# Patient Record
Sex: FEMALE | Race: WHITE | URBAN - METROPOLITAN AREA
[De-identification: names, ages, dates, MRNs, and addresses within clinical notes are randomized per-mention and may not be internally consistent; named-entity substitution may affect disease eponyms.]

---

## 2024-02-19 ENCOUNTER — OFFICE VISIT (OUTPATIENT)
Dept: FAMILY MEDICINE CLINIC | Age: 79
End: 2024-02-19
Payer: COMMERCIAL

## 2024-02-19 ENCOUNTER — HOSPITAL ENCOUNTER (OUTPATIENT)
Age: 79
Setting detail: SPECIMEN
Discharge: HOME | End: 2024-02-19

## 2024-02-19 VITALS
SYSTOLIC BLOOD PRESSURE: 136 MMHG | TEMPERATURE: 97 F | DIASTOLIC BLOOD PRESSURE: 79 MMHG | HEIGHT: 63 IN | HEART RATE: 62 BPM | WEIGHT: 137.6 LBS | OXYGEN SATURATION: 99 % | BODY MASS INDEX: 24.38 KG/M2

## 2024-02-19 DIAGNOSIS — Z13.220 ENCOUNTER FOR LIPID SCREENING FOR CARDIOVASCULAR DISEASE: ICD-10-CM

## 2024-02-19 DIAGNOSIS — Z12.31 ENCOUNTER FOR SCREENING MAMMOGRAM FOR MALIGNANT NEOPLASM OF BREAST: ICD-10-CM

## 2024-02-19 DIAGNOSIS — Z13.6 ENCOUNTER FOR LIPID SCREENING FOR CARDIOVASCULAR DISEASE: ICD-10-CM

## 2024-02-19 DIAGNOSIS — Z76.89 ENCOUNTER TO ESTABLISH CARE WITH NEW DOCTOR: Primary | ICD-10-CM

## 2024-02-19 DIAGNOSIS — K86.2 PANCREAS CYST: ICD-10-CM

## 2024-02-19 DIAGNOSIS — Z13.1 DIABETES MELLITUS SCREENING: ICD-10-CM

## 2024-02-19 DIAGNOSIS — Z76.89 ENCOUNTER TO ESTABLISH CARE WITH NEW DOCTOR: ICD-10-CM

## 2024-02-19 DIAGNOSIS — R09.82 POSTNASAL DRIP: ICD-10-CM

## 2024-02-19 DIAGNOSIS — H53.8 BLURRED VISION: ICD-10-CM

## 2024-02-19 DIAGNOSIS — Z12.83 SKIN CANCER SCREENING: ICD-10-CM

## 2024-02-19 DIAGNOSIS — K57.90 DIVERTICULOSIS: ICD-10-CM

## 2024-02-19 DIAGNOSIS — R49.0 HOARSENESS OF VOICE: ICD-10-CM

## 2024-02-19 LAB
ALBUMIN SERPL-MCNC: 4.3 G/DL (ref 3.5–5.2)
ALBUMIN/GLOB SERPL: 1.4 {RATIO} (ref 1–2.5)
ALP SERPL-CCNC: 57 U/L (ref 35–104)
ALT SERPL-CCNC: 15 U/L (ref 5–33)
ANION GAP SERPL CALCULATED.3IONS-SCNC: 12 MMOL/L (ref 9–17)
AST SERPL-CCNC: 19 U/L
BACTERIA URNS QL MICRO: ABNORMAL
BASOPHILS # BLD: 0.04 K/UL (ref 0–0.2)
BASOPHILS NFR BLD: 1 % (ref 0–2)
BILIRUB SERPL-MCNC: 0.5 MG/DL (ref 0.3–1.2)
BILIRUB UR QL STRIP: NEGATIVE
BUN SERPL-MCNC: 19 MG/DL (ref 8–23)
CALCIUM SERPL-MCNC: 9.8 MG/DL (ref 8.6–10.4)
CANCER AG19-9 SERPL IA-ACNC: 42 U/ML (ref 0–35)
CASTS #/AREA URNS LPF: ABNORMAL /LPF (ref 0–8)
CHLORIDE SERPL-SCNC: 105 MMOL/L (ref 98–107)
CHOLEST SERPL-MCNC: 245 MG/DL
CHOLESTEROL/HDL RATIO: 3
CLARITY UR: CLEAR
CO2 SERPL-SCNC: 28 MMOL/L (ref 20–31)
COLOR UR: YELLOW
CREAT SERPL-MCNC: 0.9 MG/DL (ref 0.5–0.9)
EOSINOPHIL # BLD: 0.12 K/UL (ref 0–0.44)
EOSINOPHILS RELATIVE PERCENT: 2 % (ref 1–4)
EPI CELLS #/AREA URNS HPF: ABNORMAL /HPF (ref 0–5)
ERYTHROCYTE [DISTWIDTH] IN BLOOD BY AUTOMATED COUNT: 13.7 % (ref 11.8–14.4)
GFR SERPL CREATININE-BSD FRML MDRD: >60 ML/MIN/1.73M2
GLUCOSE SERPL-MCNC: 95 MG/DL (ref 70–99)
GLUCOSE UR STRIP-MCNC: NEGATIVE MG/DL
HCT VFR BLD AUTO: 42.4 % (ref 36.3–47.1)
HDLC SERPL-MCNC: 81 MG/DL
HGB BLD-MCNC: 13.6 G/DL (ref 11.9–15.1)
HGB UR QL STRIP.AUTO: NEGATIVE
IMM GRANULOCYTES # BLD AUTO: 0.04 K/UL (ref 0–0.3)
IMM GRANULOCYTES NFR BLD: 1 %
KETONES UR STRIP-MCNC: NEGATIVE MG/DL
LDLC SERPL CALC-MCNC: 142 MG/DL (ref 0–130)
LEUKOCYTE ESTERASE UR QL STRIP: ABNORMAL
LIPASE SERPL-CCNC: 32 U/L (ref 13–60)
LYMPHOCYTES NFR BLD: 1.6 K/UL (ref 1.1–3.7)
LYMPHOCYTES RELATIVE PERCENT: 31 % (ref 24–43)
MCH RBC QN AUTO: 30.7 PG (ref 25.2–33.5)
MCHC RBC AUTO-ENTMCNC: 32.1 G/DL (ref 28.4–34.8)
MCV RBC AUTO: 95.7 FL (ref 82.6–102.9)
MONOCYTES NFR BLD: 0.51 K/UL (ref 0.1–1.2)
MONOCYTES NFR BLD: 10 % (ref 3–12)
NEUTROPHILS NFR BLD: 55 % (ref 36–65)
NEUTS SEG NFR BLD: 2.85 K/UL (ref 1.5–8.1)
NITRITE UR QL STRIP: POSITIVE
NRBC BLD-RTO: 0 PER 100 WBC
PH UR STRIP: 5.5 [PH] (ref 5–8)
PLATELET # BLD AUTO: 239 K/UL (ref 138–453)
PMV BLD AUTO: 10.3 FL (ref 8.1–13.5)
POTASSIUM SERPL-SCNC: 5 MMOL/L (ref 3.7–5.3)
PROT SERPL-MCNC: 7.3 G/DL (ref 6.4–8.3)
PROT UR STRIP-MCNC: NEGATIVE MG/DL
RBC # BLD AUTO: 4.43 M/UL (ref 3.95–5.11)
RBC #/AREA URNS HPF: ABNORMAL /HPF (ref 0–4)
SODIUM SERPL-SCNC: 145 MMOL/L (ref 135–144)
SP GR UR STRIP: 1.02 (ref 1–1.03)
TRIGL SERPL-MCNC: 108 MG/DL
TSH SERPL DL<=0.05 MIU/L-ACNC: 3.03 UIU/ML (ref 0.3–5)
UROBILINOGEN UR STRIP-ACNC: NORMAL EU/DL (ref 0–1)
WBC #/AREA URNS HPF: ABNORMAL /HPF (ref 0–5)
WBC OTHER # BLD: 5.2 K/UL (ref 3.5–11.3)

## 2024-02-19 PROCEDURE — 1123F ACP DISCUSS/DSCN MKR DOCD: CPT | Performed by: FAMILY MEDICINE

## 2024-02-19 PROCEDURE — 99204 OFFICE O/P NEW MOD 45 MIN: CPT | Performed by: FAMILY MEDICINE

## 2024-02-19 RX ORDER — CYCLOSPORINE 0.5 MG/ML
1 EMULSION OPHTHALMIC 2 TIMES DAILY
COMMUNITY

## 2024-02-19 SDOH — ECONOMIC STABILITY: FOOD INSECURITY: WITHIN THE PAST 12 MONTHS, THE FOOD YOU BOUGHT JUST DIDN'T LAST AND YOU DIDN'T HAVE MONEY TO GET MORE.: NEVER TRUE

## 2024-02-19 SDOH — ECONOMIC STABILITY: INCOME INSECURITY: HOW HARD IS IT FOR YOU TO PAY FOR THE VERY BASICS LIKE FOOD, HOUSING, MEDICAL CARE, AND HEATING?: NOT HARD AT ALL

## 2024-02-19 SDOH — ECONOMIC STABILITY: HOUSING INSECURITY
IN THE LAST 12 MONTHS, WAS THERE A TIME WHEN YOU DID NOT HAVE A STEADY PLACE TO SLEEP OR SLEPT IN A SHELTER (INCLUDING NOW)?: NO

## 2024-02-19 SDOH — ECONOMIC STABILITY: FOOD INSECURITY: WITHIN THE PAST 12 MONTHS, YOU WORRIED THAT YOUR FOOD WOULD RUN OUT BEFORE YOU GOT MONEY TO BUY MORE.: NEVER TRUE

## 2024-02-19 ASSESSMENT — PATIENT HEALTH QUESTIONNAIRE - PHQ9
SUM OF ALL RESPONSES TO PHQ QUESTIONS 1-9: 0
1. LITTLE INTEREST OR PLEASURE IN DOING THINGS: 0
SUM OF ALL RESPONSES TO PHQ9 QUESTIONS 1 & 2: 0
2. FEELING DOWN, DEPRESSED OR HOPELESS: 0
SUM OF ALL RESPONSES TO PHQ QUESTIONS 1-9: 0

## 2024-02-19 NOTE — PROGRESS NOTES
score)  Never done    Respiratory Syncytial Virus (RSV) Pregnant or age 60 yrs+ (1 - 1-dose 60+ series) Never done    Pneumococcal 65+ years Vaccine (1 - PCV) Never done    Flu vaccine (1) Never done    Depression Screen  02/19/2025    Hepatitis A vaccine  Aged Out    Hepatitis B vaccine  Aged Out    Hib vaccine  Aged Out    Polio vaccine  Aged Out    Meningococcal (ACWY) vaccine  Aged Out       Assessment & Plan   Encounter to establish care with new doctor  -     CBC with Auto Differential; Future  -     Comprehensive Metabolic Panel; Future  -     TSH with Reflex; Future  -     Urinalysis; Future  -     Lipid Panel; Future  -     EKG 12 lead; Future  Pancreas cyst  -     AFL - Дмитрий Hatch DO, Gastroenterology, [place]  -     Lipase; Future  -     Cancer Antigen 19-9; Future  Diverticulosis  -     AFL - Дмитрий Hatch DO, Gastroenterology, [place]  -     CBC with Auto Differential; Future  -     Comprehensive Metabolic Panel; Future  -     TSH with Reflex; Future  -     Urinalysis; Future  -     Lipid Panel; Future  Blurred vision  -     External Referral To Ophthalmology  Hoarseness of voice  -     AFL - Donavan Shafer MD, Otolaryngology, [place]  Skin cancer screening  -     AFL - Shelli Harmon MD, Dermatology, [place]  Encounter for lipid screening for cardiovascular disease  -     Urinalysis; Future  -     Lipid Panel; Future  Diabetes mellitus screening  -     Comprehensive Metabolic Panel; Future  Encounter for screening mammogram for malignant neoplasm of breast  -     TSH with Reflex; Future  -     BETI DIGITAL SCREEN W OR WO CAD BILATERAL; Future  Postnasal drip    Overall the patient is doing well.  I discussed with her I would like to send her to gastroenterologist for further recommendation.  I really do not feel there is need to have an MRI done every 6 months but then again the specialist will advise different.  I did order blood work as requested.  Patient also needs to see a ophthalmologist as

## 2024-02-20 DIAGNOSIS — K86.2 PANCREATIC CYST: Primary | ICD-10-CM

## 2024-02-20 DIAGNOSIS — R97.8 ELEVATED CA 19-9 LEVEL: ICD-10-CM

## 2024-02-21 ENCOUNTER — OFFICE VISIT (OUTPATIENT)
Dept: FAMILY MEDICINE CLINIC | Age: 79
End: 2024-02-21
Payer: COMMERCIAL

## 2024-02-21 VITALS
DIASTOLIC BLOOD PRESSURE: 83 MMHG | BODY MASS INDEX: 24.55 KG/M2 | WEIGHT: 138.6 LBS | TEMPERATURE: 97 F | HEART RATE: 84 BPM | SYSTOLIC BLOOD PRESSURE: 136 MMHG | OXYGEN SATURATION: 99 %

## 2024-02-21 DIAGNOSIS — R97.8 ELEVATED CA 19-9 LEVEL: ICD-10-CM

## 2024-02-21 DIAGNOSIS — K86.2 PANCREAS CYST: Primary | ICD-10-CM

## 2024-02-21 PROCEDURE — 99213 OFFICE O/P EST LOW 20 MIN: CPT | Performed by: FAMILY MEDICINE

## 2024-02-21 PROCEDURE — 1123F ACP DISCUSS/DSCN MKR DOCD: CPT | Performed by: FAMILY MEDICINE

## 2024-02-21 NOTE — PROGRESS NOTES
General FM note    Roseanna Chua is a 78 y.o. female who presents today for follow up on her  medical conditions as noted below.  Roseanna Chua is c/o of   Chief Complaint   Patient presents with    Results     Labs 24       There is no problem list on file for this patient.     No past medical history on file.   No past surgical history on file.  No family history on file.  Current Outpatient Medications   Medication Sig Dispense Refill    cycloSPORINE (RESTASIS) 0.05 % ophthalmic emulsion 1 drop 2 times daily       No current facility-administered medications for this visit.     ALLERGIES:  No Known Allergies    Social History     Tobacco Use    Smoking status: Former     Current packs/day: 0.00     Average packs/day: 0.5 packs/day for 12.0 years (6.0 ttl pk-yrs)     Types: Cigarettes     Start date:      Quit date:      Years since quittin.1    Smokeless tobacco: Never   Substance Use Topics    Alcohol use: Not on file      Body mass index is 24.55 kg/m².  /83   Pulse 84   Temp 97 °F (36.1 °C)   Wt 62.9 kg (138 lb 9.6 oz)   SpO2 99%   BMI 24.55 kg/m²     Subjective:      HPI    78 y.o. female coming today to review her recent blood work.  The patient states that she really did not understand the phone message so she came in.    The CA 19-9 slightly elevated.  The patient has a history of a pancreatic cyst which was followed by yearly MRIs.  Patient tells me that she has an appointment with gastro set up already.  She also has the MRI scheduled already which I ordered.    Review of Systems   Constitutional: Negative for fever and unexpected weight change.   Pertinent items are noted in HPI.    Objective:   Physical Exam  Constitutional: VS (see above).   General appearance: normal development, habitus and attention, no deformities. No distress.  Eyes: normal conjunctiva and lids.  Skin: no rashes, lesions or ulcers.  Musculoskeletal: normal gait. Nails: no clubbing or

## 2024-02-26 ENCOUNTER — TELEPHONE (OUTPATIENT)
Dept: FAMILY MEDICINE CLINIC | Age: 79
End: 2024-02-26

## 2024-02-26 DIAGNOSIS — H53.8 BLURRED VISION: Primary | ICD-10-CM

## 2024-02-26 NOTE — TELEPHONE ENCOUNTER
Patient is having blurry vision and dizziness, has a referral to [facility]  but she can't get in for 3 weeks, is requesting a different Ophthalmologist so she can be seen sooner  Please advise.

## 2024-02-28 ENCOUNTER — OFFICE VISIT (OUTPATIENT)
Dept: FAMILY MEDICINE CLINIC | Age: 79
End: 2024-02-28
Payer: COMMERCIAL

## 2024-02-28 VITALS
HEART RATE: 78 BPM | BODY MASS INDEX: 24.45 KG/M2 | WEIGHT: 138 LBS | TEMPERATURE: 97.2 F | DIASTOLIC BLOOD PRESSURE: 73 MMHG | SYSTOLIC BLOOD PRESSURE: 138 MMHG | OXYGEN SATURATION: 99 %

## 2024-02-28 DIAGNOSIS — K59.04 CHRONIC IDIOPATHIC CONSTIPATION: ICD-10-CM

## 2024-02-28 DIAGNOSIS — R41.3 MEMORY CHANGE: Primary | ICD-10-CM

## 2024-02-28 DIAGNOSIS — R42 DIZZINESS: ICD-10-CM

## 2024-02-28 PROCEDURE — 1123F ACP DISCUSS/DSCN MKR DOCD: CPT | Performed by: FAMILY MEDICINE

## 2024-02-28 PROCEDURE — 99213 OFFICE O/P EST LOW 20 MIN: CPT | Performed by: FAMILY MEDICINE

## 2024-02-28 RX ORDER — MECLIZINE HYDROCHLORIDE 25 MG/1
25 TABLET ORAL 3 TIMES DAILY PRN
Qty: 15 TABLET | Refills: 0 | Status: SHIPPED | OUTPATIENT
Start: 2024-02-28 | End: 2024-03-09

## 2024-02-28 ASSESSMENT — PATIENT HEALTH QUESTIONNAIRE - PHQ9
SUM OF ALL RESPONSES TO PHQ QUESTIONS 1-9: 0
1. LITTLE INTEREST OR PLEASURE IN DOING THINGS: 0
2. FEELING DOWN, DEPRESSED OR HOPELESS: 0
SUM OF ALL RESPONSES TO PHQ9 QUESTIONS 1 & 2: 0
SUM OF ALL RESPONSES TO PHQ QUESTIONS 1-9: 0

## 2024-02-28 NOTE — PATIENT INSTRUCTIONS
Thank you for choosing [facility].  We know you have options when it comes to your healthcare; we appreciate that you chose us. Our goal is to provide exceptional  service and world class care to every patient.  You will be receiving a survey via email or text message asking for your feedback.  Please take a few minutes to share your thoughts about your recent visit. Your comments helps us understand what we do well and ways we can improve.  Thank you in advance for your valuable feedback.

## 2024-02-28 NOTE — PROGRESS NOTES
General FM note    Roseanna Chua is a 78 y.o. female who presents today for follow up on her  medical conditions as noted below.  Roseanna Chua is c/o of   Chief Complaint   Patient presents with    Nausea     Since Saturday    Dizziness    Facial Pain     Forehead/temples    Blurred Vision       There is no problem list on file for this patient.     No past medical history on file.   No past surgical history on file.  No family history on file.  Current Outpatient Medications   Medication Sig Dispense Refill    meclizine (ANTIVERT) 25 MG tablet Take 1 tablet by mouth 3 times daily as needed for Dizziness 15 tablet 0    psyllium (KONSYL) 60.3 % PACK powder Take 1 packet by mouth daily 30 each 4    cycloSPORINE (RESTASIS) 0.05 % ophthalmic emulsion 1 drop 2 times daily       No current facility-administered medications for this visit.     ALLERGIES:  No Known Allergies    Social History     Tobacco Use    Smoking status: Former     Current packs/day: 0.00     Average packs/day: 0.5 packs/day for 12.0 years (6.0 ttl pk-yrs)     Types: Cigarettes     Start date:      Quit date:      Years since quittin.1    Smokeless tobacco: Never   Substance Use Topics    Alcohol use: Not on file      Body mass index is 24.45 kg/m².  /73   Pulse 78   Temp 97.2 °F (36.2 °C)   Wt 62.6 kg (138 lb)   SpO2 99%   BMI 24.45 kg/m²     Subjective:      HPI    78 y.o. female coming in today with dizziness throughout the day also onset of nausea and onset of sinus pressure.  The symptoms the patient did have for 4 days.  She also states that she is not sleeping.  That she is very stressed overall her  passed away she moved back to [place] she had to sell the house and she is very very stressed.  She also has an MRI coming up as her pancreatic screening/ CA 19-9 was slightly elevated.  The patient just feels stressed.  And she states so she feels that her memory is slipping.  She has an appointment coming up

## 2024-03-12 ENCOUNTER — HOSPITAL ENCOUNTER (OUTPATIENT)
Age: 79
Discharge: HOME | End: 2024-03-12
Payer: MEDICARE

## 2024-03-12 ENCOUNTER — HOSPITAL ENCOUNTER (OUTPATIENT)
Dept: MRI IMAGING | Age: 79
Discharge: HOME | End: 2024-03-14
Payer: MEDICARE

## 2024-03-12 DIAGNOSIS — Z76.89 ENCOUNTER TO ESTABLISH CARE WITH NEW DOCTOR: ICD-10-CM

## 2024-03-12 DIAGNOSIS — K86.2 PANCREAS CYST: ICD-10-CM

## 2024-03-12 DIAGNOSIS — R97.8 ELEVATED CA 19-9 LEVEL: ICD-10-CM

## 2024-03-12 PROCEDURE — A9579 GAD-BASE MR CONTRAST NOS,1ML: HCPCS | Performed by: FAMILY MEDICINE

## 2024-03-12 PROCEDURE — 6360000004 HC RX CONTRAST MEDICATION: Performed by: FAMILY MEDICINE

## 2024-03-12 PROCEDURE — 2580000003 HC RX 258: Performed by: FAMILY MEDICINE

## 2024-03-12 PROCEDURE — 74183 MRI ABD W/O CNTR FLWD CNTR: CPT

## 2024-03-12 PROCEDURE — 93005 ELECTROCARDIOGRAM TRACING: CPT

## 2024-03-12 RX ORDER — SODIUM CHLORIDE 0.9 % (FLUSH) 0.9 %
10 SYRINGE (ML) INJECTION ONCE
Status: COMPLETED | OUTPATIENT
Start: 2024-03-12 | End: 2024-03-12

## 2024-03-12 RX ORDER — 0.9 % SODIUM CHLORIDE 0.9 %
100 INTRAVENOUS SOLUTION INTRAVENOUS ONCE
Status: COMPLETED | OUTPATIENT
Start: 2024-03-12 | End: 2024-03-12

## 2024-03-12 RX ADMIN — SODIUM CHLORIDE 50 ML: 9 INJECTION, SOLUTION INTRAVENOUS at 13:01

## 2024-03-12 RX ADMIN — GADOTERIDOL 12 ML: 279.3 INJECTION, SOLUTION INTRAVENOUS at 13:01

## 2024-03-12 RX ADMIN — SODIUM CHLORIDE, PRESERVATIVE FREE 10 ML: 5 INJECTION INTRAVENOUS at 13:01

## 2024-03-13 LAB
EKG ATRIAL RATE: 74 BPM
EKG P AXIS: 77 DEGREES
EKG P-R INTERVAL: 168 MS
EKG Q-T INTERVAL: 412 MS
EKG QRS DURATION: 82 MS
EKG QTC CALCULATION (BAZETT): 457 MS
EKG R AXIS: 15 DEGREES
EKG T AXIS: 68 DEGREES
EKG VENTRICULAR RATE: 74 BPM

## 2024-03-14 ENCOUNTER — HOSPITAL ENCOUNTER (OUTPATIENT)
Dept: CT IMAGING | Age: 79
Discharge: HOME | End: 2024-03-16
Payer: MEDICARE

## 2024-03-14 DIAGNOSIS — R41.3 MEMORY CHANGE: ICD-10-CM

## 2024-03-14 PROCEDURE — 70450 CT HEAD/BRAIN W/O DYE: CPT

## 2024-03-16 ENCOUNTER — HOSPITAL ENCOUNTER (OUTPATIENT)
Dept: MAMMOGRAPHY | Age: 79
End: 2024-03-16
Payer: MEDICARE

## 2024-03-16 DIAGNOSIS — Z12.31 ENCOUNTER FOR SCREENING MAMMOGRAM FOR MALIGNANT NEOPLASM OF BREAST: ICD-10-CM

## 2024-03-16 PROCEDURE — 77063 BREAST TOMOSYNTHESIS BI: CPT

## 2024-03-20 ENCOUNTER — TELEPHONE (OUTPATIENT)
Dept: FAMILY MEDICINE CLINIC | Age: 79
End: 2024-03-20

## 2024-03-20 DIAGNOSIS — R97.8 ELEVATED CA 19-9 LEVEL: Primary | ICD-10-CM

## 2024-03-20 DIAGNOSIS — Z01.818 PREOPERATIVE CLEARANCE: ICD-10-CM

## 2024-03-20 NOTE — TELEPHONE ENCOUNTER
----- Message from Allison Harmon sent at 3/19/2024  3:23 PM EDT -----  Subject: Referral Request    Reason for referral request? Patient needs a referral for a cardiologist   ASA, per Dr. Hatch , patient is to have a scope done but has to get   approval from a cardiologist. Please call patient   Provider patient wants to be referred to(if known):     Provider Phone Number(if known):    Additional Information for Provider?   ---------------------------------------------------------------------------  --------------  CALL BACK INFO    [phone]; OK to leave message on voicemail,OK to respond with electronic   message via [employer] portal (only for patients who have registered [employer]   account)  ---------------------------------------------------------------------------  --------------

## 2024-03-20 NOTE — TELEPHONE ENCOUNTER
----- Message from April Nagle sent at 3/20/2024  2:19 PM EDT -----  Subject: Referral Request    Reason for referral request? patient would like a referral to dr. osiel acevedo, cardiology. patient is seeing this physician on 4/11. per   patient she had the results of her nuclear stress test and Holter monitor   faxed to dr. hudson's office, and would like this included in the   referral. please call patient back to advise. thank you   Provider patient wants to be referred to(if known):     Provider Phone Number(if known):    Additional Information for Provider?   ---------------------------------------------------------------------------  --------------  CALL BACK INFO    [phone]; OK to leave message on voicemail  ---------------------------------------------------------------------------  --------------

## 2024-04-01 DIAGNOSIS — R92.8 ABNORMAL MAMMOGRAM OF RIGHT BREAST: Primary | ICD-10-CM

## 2024-04-03 DIAGNOSIS — R92.8 ABNORMAL MAMMOGRAM OF RIGHT BREAST: ICD-10-CM

## 2024-04-03 DIAGNOSIS — R92.8 ABNORMAL MAMMOGRAM: ICD-10-CM

## 2024-04-11 ENCOUNTER — OFFICE VISIT (OUTPATIENT)
Age: 79
End: 2024-04-11
Payer: MEDICARE

## 2024-04-11 VITALS
SYSTOLIC BLOOD PRESSURE: 158 MMHG | DIASTOLIC BLOOD PRESSURE: 77 MMHG | HEIGHT: 63 IN | HEART RATE: 90 BPM | BODY MASS INDEX: 24.63 KG/M2 | OXYGEN SATURATION: 100 % | RESPIRATION RATE: 16 BRPM | WEIGHT: 139 LBS

## 2024-04-11 DIAGNOSIS — Z01.810 PREOP CARDIOVASCULAR EXAM: Primary | ICD-10-CM

## 2024-04-11 PROCEDURE — 1123F ACP DISCUSS/DSCN MKR DOCD: CPT | Performed by: INTERNAL MEDICINE

## 2024-04-11 PROCEDURE — 99204 OFFICE O/P NEW MOD 45 MIN: CPT | Performed by: INTERNAL MEDICINE

## 2024-04-11 RX ORDER — FAMOTIDINE 20 MG/1
1 TABLET, FILM COATED ORAL EVERY MORNING
COMMUNITY
Start: 2023-04-18

## 2024-04-11 RX ORDER — FAMOTIDINE 10 MG
10 TABLET ORAL 2 TIMES DAILY
COMMUNITY

## 2024-04-11 RX ORDER — OMEPRAZOLE 40 MG/1
CAPSULE, DELAYED RELEASE ORAL
COMMUNITY
Start: 2024-03-01

## 2024-04-11 NOTE — PROGRESS NOTES
\"GLUCOSE\" in the last 72 hours.  PT/INR: No results for input(s): \"PROTIME\", \"INR\" in the last 72 hours.  FASTING LIPID PANEL:  Lab Results   Component Value Date/Time    HDL 81 02/19/2024 09:50 AM    TRIG 108 02/19/2024 09:50 AM     LIVER PROFILE:No results for input(s): \"AST\", \"ALT\", \"LABALBU\" in the last 72 hours.    EKG 03/2024: NSR, LAE, possible anterior infract       Past Medical and Surgical History, Problem List, Allergies, Medications, Labs, Imaging, all reviewed extensively in EMR and with the patient.    Assessment:   Preop cardiac evaluation prior to endoscopy  Abnormal ECG  Hypertension  Hyperlipidemia  Former smoker    Plan:   Will obtain echocardiogram to rule out any underlying structural heart disease  Patient will be low risk for endoscopy if echocardiogram within normal limits  Will retrieve cardiac stress test from [place]  Blood pressure elevated in office today, recommend close monitoring at home, low-salt diet discussed  Dietary modifications discussed for elevated LDL and cholesterol.  Repeat fasting lipid panel in 3-6 months.  If LDL remains elevated we will start low-dose statin      The patient was counseled regarding heart healthy diet, weight loss and exercise as tolerated. Patient's medications and side effects were discussed. All questions and concerns were addressed to patient's satisfaction.     The patient is to follow up in 6 months or sooner if necessary.     Thank you for allowing me to participate in the care of this patient, please do not hesitate to call if you have any questions.      Henri Faith MD, FACC, [facility]

## 2024-04-22 ENCOUNTER — HOSPITAL ENCOUNTER (OUTPATIENT)
Dept: MAMMOGRAPHY | Age: 79
Discharge: HOME | End: 2024-04-24
Payer: MEDICARE

## 2024-04-22 ENCOUNTER — HOSPITAL ENCOUNTER (OUTPATIENT)
Dept: ULTRASOUND IMAGING | Age: 79
End: 2024-04-22
Payer: MEDICARE

## 2024-04-22 DIAGNOSIS — R92.8 ABNORMAL MAMMOGRAM OF RIGHT BREAST: ICD-10-CM

## 2024-04-22 PROCEDURE — G0279 TOMOSYNTHESIS, MAMMO: HCPCS

## 2024-04-26 ENCOUNTER — HOSPITAL ENCOUNTER (OUTPATIENT)
Age: 79
End: 2024-04-26
Attending: INTERNAL MEDICINE
Payer: MEDICARE

## 2024-04-26 DIAGNOSIS — Z01.810 PREOP CARDIOVASCULAR EXAM: ICD-10-CM

## 2024-04-26 LAB
ECHO AO ROOT DIAM: 2.4 CM
ECHO AV PEAK GRADIENT: 6 MMHG
ECHO AV PEAK VELOCITY: 1.2 M/S
ECHO EST RA PRESSURE: 3 MMHG
ECHO LA AREA 4C: 11.7 CM2
ECHO LA DIAMETER: 2.8 CM
ECHO LA MAJOR AXIS: 4.6 CM
ECHO LA TO AORTIC ROOT RATIO: 1.17
ECHO LA VOL MOD A4C: 24 ML (ref 22–52)
ECHO LV E' LATERAL VELOCITY: 9 CM/S
ECHO LV E' SEPTAL VELOCITY: 6 CM/S
ECHO LV FRACTIONAL SHORTENING: 34 % (ref 28–44)
ECHO LV INTERNAL DIMENSION DIASTOLIC: 4.1 CM (ref 3.9–5.3)
ECHO LV INTERNAL DIMENSION SYSTOLIC: 2.7 CM
ECHO LV IVSD: 0.7 CM (ref 0.6–0.9)
ECHO LV MASS 2D: 89.4 G (ref 67–162)
ECHO LV POSTERIOR WALL DIASTOLIC: 0.8 CM (ref 0.6–0.9)
ECHO LV RELATIVE WALL THICKNESS RATIO: 0.39
ECHO MV A VELOCITY: 0.86 M/S
ECHO MV E DECELERATION TIME (DT): 246 MS
ECHO MV E VELOCITY: 0.65 M/S
ECHO MV E/A RATIO: 0.76
ECHO MV E/E' LATERAL: 7.22
ECHO MV E/E' RATIO (AVERAGED): 9.03
ECHO RIGHT VENTRICULAR SYSTOLIC PRESSURE (RVSP): 8 MMHG
ECHO TV REGURGITANT MAX VELOCITY: 1.12 M/S
ECHO TV REGURGITANT PEAK GRADIENT: 5 MMHG

## 2024-04-26 PROCEDURE — 93306 TTE W/DOPPLER COMPLETE: CPT

## 2024-04-26 PROCEDURE — 93306 TTE W/DOPPLER COMPLETE: CPT | Performed by: INTERNAL MEDICINE

## 2024-04-28 SDOH — HEALTH STABILITY: PHYSICAL HEALTH: ON AVERAGE, HOW MANY DAYS PER WEEK DO YOU ENGAGE IN MODERATE TO STRENUOUS EXERCISE (LIKE A BRISK WALK)?: 0 DAYS

## 2024-04-28 SDOH — HEALTH STABILITY: PHYSICAL HEALTH: ON AVERAGE, HOW MANY MINUTES DO YOU ENGAGE IN EXERCISE AT THIS LEVEL?: 0 MIN

## 2024-04-28 ASSESSMENT — LIFESTYLE VARIABLES
HOW MANY STANDARD DRINKS CONTAINING ALCOHOL DO YOU HAVE ON A TYPICAL DAY: PATIENT DOES NOT DRINK
HOW MANY STANDARD DRINKS CONTAINING ALCOHOL DO YOU HAVE ON A TYPICAL DAY: 0
HOW OFTEN DO YOU HAVE SIX OR MORE DRINKS ON ONE OCCASION: 1
HOW OFTEN DO YOU HAVE A DRINK CONTAINING ALCOHOL: NEVER
HOW OFTEN DO YOU HAVE A DRINK CONTAINING ALCOHOL: 1

## 2024-04-28 ASSESSMENT — PATIENT HEALTH QUESTIONNAIRE - PHQ9
1. LITTLE INTEREST OR PLEASURE IN DOING THINGS: NOT AT ALL
SUM OF ALL RESPONSES TO PHQ9 QUESTIONS 1 & 2: 0
SUM OF ALL RESPONSES TO PHQ QUESTIONS 1-9: 0
2. FEELING DOWN, DEPRESSED OR HOPELESS: NOT AT ALL
SUM OF ALL RESPONSES TO PHQ QUESTIONS 1-9: 0

## 2024-05-01 ENCOUNTER — HOSPITAL ENCOUNTER (OUTPATIENT)
Age: 79
Setting detail: SPECIMEN
Discharge: HOME | End: 2024-05-01

## 2024-05-01 ENCOUNTER — OFFICE VISIT (OUTPATIENT)
Dept: FAMILY MEDICINE CLINIC | Age: 79
End: 2024-05-01

## 2024-05-01 VITALS
TEMPERATURE: 97.2 F | DIASTOLIC BLOOD PRESSURE: 76 MMHG | BODY MASS INDEX: 24.98 KG/M2 | WEIGHT: 141 LBS | HEART RATE: 76 BPM | HEIGHT: 63 IN | SYSTOLIC BLOOD PRESSURE: 138 MMHG | OXYGEN SATURATION: 100 %

## 2024-05-01 DIAGNOSIS — R97.8 ELEVATED CA 19-9 LEVEL: ICD-10-CM

## 2024-05-01 DIAGNOSIS — Z78.0 ASYMPTOMATIC MENOPAUSE: ICD-10-CM

## 2024-05-01 DIAGNOSIS — Z00.00 WELCOME TO MEDICARE PREVENTIVE VISIT: Primary | ICD-10-CM

## 2024-05-01 LAB — CANCER AG19-9 SERPL IA-ACNC: 44 U/ML (ref 0–35)

## 2024-05-01 NOTE — PATIENT INSTRUCTIONS
Thank you for choosing [facility].  We know you have options when it comes to your healthcare; we appreciate that you chose us. Our goal is to provide exceptional  service and world class care to every patient.  You will be receiving a survey via email or text message asking for your feedback.  Please take a few minutes to share your thoughts about your recent visit. Your comments helps us understand what we do well and ways we can improve.  Thank you in advance for your valuable feedback.           Learning About Being Active as an Older Adult  Why is being active important as you get older?     Being active is one of the best things you can do for your health. And it's never too late to start. Being active--or getting active, if you aren't already--has definite benefits. It can:  Give you more energy,  Keep your mind sharp.  Improve balance to reduce your risk of falls.  Help you manage chronic illness with fewer medicines.  No matter how old you are, how fit you are, or what health problems you have, there is a form of activity that will work for you. And the more physical activity you can do, the better your overall health will be.  What kinds of activity can help you stay healthy?  Being more active will make your daily activities easier. Physical activity includes planned exercise and things you do in daily life. There are four types of activity:  Aerobic.  Doing aerobic activity makes your heart and lungs strong.  Includes walking, dancing, and gardening.  Aim for at least 2½ hours spread throughout the week.  It improves your energy and can help you sleep better.  Muscle-strengthening.  This type of activity can help maintain muscle and strengthen bones.  Includes climbing stairs, using resistance bands, and lifting or carrying heavy loads.  Aim for at least twice a week.  It can help protect the knees and other joints.  Stretching.  Stretching gives you better range of motion in joints and

## 2024-05-01 NOTE — PROGRESS NOTES
Inactivity Interventions:  See AVS for additional education material  Do you eat balanced/healthy meals regularly Interventions:  See AVS for additional education material                       Objective   Vitals:    05/01/24 0830   BP: 138/76   Pulse: 76   Temp: 97.2 °F (36.2 °C)   SpO2: 100%   Weight: 64 kg (141 lb)   Height: 1.6 m (5' 2.99\")      Body mass index is 24.98 kg/m².      HENT:   /76   Pulse 76   Temp 97.2 °F (36.2 °C)   Ht 1.6 m (5' 2.99\")   Wt 64 kg (141 lb)   SpO2 100%   BMI 24.98 kg/m²   Constitutional: Alert and oriented.  Well-nourished.  Head: Normocephalic and atraumatic.   Cardiovascular: Normal rate, regular rhythm, normal heart sounds.   Pulmonary/Chest: Effort normal and breath sounds normal. No respiratory distress.  She has no wheezes.  She has no rales.   Skin: Skin is warm and dry. No rash noted. No erythema.   Psychiatric:  She has a normal mood and affect. Behavior is normal.        Allergies   Allergen Reactions    Cat Hair Extract     Regadenoson Palpitations     Prior to Visit Medications    Medication Sig Taking? Authorizing Provider   famotidine (PEPCID) 20 MG tablet Take 1 tablet by mouth every morning Yes Hernando Braun MD   Multiple Vitamin (MULTI VITAMIN PO) Take by mouth Yes Hernando Braun MD   psyllium (KONSYL) 60.3 % PACK powder Take 1 packet by mouth daily Yes Arminda Easton MD   cycloSPORINE (RESTASIS) 0.05 % ophthalmic emulsion 1 drop 2 times daily Yes Hernando Braun MD       CareTeam (Including outside providers/suppliers regularly involved in providing care):   Patient Care Team:  Arminda Easton MD as PCP - General (Family Medicine)  Arminda Easton MD as PCP - Empaneled Provider     Reviewed and updated this visit:  Allergies  Meds        (Please note that portions of this note were completed with a voice recognition program. Efforts were made to edit the dictations but occasionally words are mis-transcribed.)

## 2024-05-02 ENCOUNTER — TELEPHONE (OUTPATIENT)
Age: 79
End: 2024-05-02

## 2024-05-02 ENCOUNTER — HOSPITAL ENCOUNTER (OUTPATIENT)
Dept: MAMMOGRAPHY | Age: 79
Discharge: HOME | End: 2024-05-04
Payer: MEDICARE

## 2024-05-02 DIAGNOSIS — Z78.0 ASYMPTOMATIC MENOPAUSE: ICD-10-CM

## 2024-05-02 PROCEDURE — 77080 DXA BONE DENSITY AXIAL: CPT

## 2024-05-02 NOTE — TELEPHONE ENCOUNTER
Pt called and stated Dr. Hatch is waiting for pt echo to be faxed over to schedule a procedure. Fax [phone] attn Sherrie

## 2024-09-12 ENCOUNTER — HOSPITAL ENCOUNTER (OUTPATIENT)
Dept: MRI IMAGING | Age: 79
Discharge: HOME | End: 2024-09-14
Attending: INTERNAL MEDICINE
Payer: MEDICARE

## 2024-09-12 ENCOUNTER — TELEPHONE (OUTPATIENT)
Dept: FAMILY MEDICINE CLINIC | Age: 79
End: 2024-09-12

## 2024-09-12 DIAGNOSIS — K86.2 PANCREATIC CYST: ICD-10-CM

## 2024-09-12 LAB
CREAT SERPL-MCNC: 1.2 MG/DL (ref 0.5–0.9)
GFR, ESTIMATED: 47 ML/MIN/1.73M2

## 2024-09-12 PROCEDURE — A9579 GAD-BASE MR CONTRAST NOS,1ML: HCPCS | Performed by: INTERNAL MEDICINE

## 2024-09-12 PROCEDURE — 6360000004 HC RX CONTRAST MEDICATION: Performed by: INTERNAL MEDICINE

## 2024-09-12 PROCEDURE — 82565 ASSAY OF CREATININE: CPT

## 2024-09-12 PROCEDURE — 74183 MRI ABD W/O CNTR FLWD CNTR: CPT

## 2024-09-12 PROCEDURE — 36415 COLL VENOUS BLD VENIPUNCTURE: CPT

## 2024-09-12 RX ADMIN — GADOTERIDOL 15 ML: 279.3 INJECTION, SOLUTION INTRAVENOUS at 08:57

## 2024-10-14 ENCOUNTER — HOSPITAL ENCOUNTER (OUTPATIENT)
Age: 79
Discharge: HOME | End: 2024-10-14
Payer: MEDICARE

## 2024-10-14 LAB
BUN SERPL-MCNC: 14 MG/DL (ref 8–23)
CANCER AG19-9 SERPL IA-ACNC: 43 U/ML (ref 0–35)
CREAT SERPL-MCNC: 1 MG/DL (ref 0.5–0.9)
GFR, ESTIMATED: 56 ML/MIN/1.73M2

## 2024-10-14 PROCEDURE — 84520 ASSAY OF UREA NITROGEN: CPT

## 2024-10-14 PROCEDURE — 36415 COLL VENOUS BLD VENIPUNCTURE: CPT

## 2024-10-14 PROCEDURE — 82565 ASSAY OF CREATININE: CPT

## 2024-10-14 PROCEDURE — 86301 IMMUNOASSAY TUMOR CA 19-9: CPT

## 2024-10-16 ENCOUNTER — TELEPHONE (OUTPATIENT)
Dept: GASTROENTEROLOGY | Age: 79
End: 2024-10-16

## 2024-10-16 ENCOUNTER — PREP FOR PROCEDURE (OUTPATIENT)
Dept: GASTROENTEROLOGY | Age: 79
End: 2024-10-16

## 2024-10-16 DIAGNOSIS — K86.2 PANCREATIC CYST: ICD-10-CM

## 2024-10-16 NOTE — TELEPHONE ENCOUNTER
Procedure scheduled/Hamdani  Procedure: EGD/EUS  Dx: PANCREATIC CYST  Ordering: ZACH  Date: 11/15/24  Time: 10:00AM/ARRIVE 8:30AM  Hospital: [facility]  Bowel prep instructions sent to patient: NONE  Patient advised by phone/mail: PHONE/MAILED EUS INSTRUCTIONS

## 2024-10-21 ENCOUNTER — OFFICE VISIT (OUTPATIENT)
Dept: FAMILY MEDICINE CLINIC | Age: 79
End: 2024-10-21
Payer: MEDICARE

## 2024-10-21 ENCOUNTER — HOSPITAL ENCOUNTER (OUTPATIENT)
Age: 79
Setting detail: SPECIMEN
Discharge: HOME | End: 2024-10-21

## 2024-10-21 VITALS
SYSTOLIC BLOOD PRESSURE: 135 MMHG | BODY MASS INDEX: 24.1 KG/M2 | OXYGEN SATURATION: 100 % | HEART RATE: 70 BPM | WEIGHT: 136 LBS | DIASTOLIC BLOOD PRESSURE: 75 MMHG | TEMPERATURE: 97.3 F

## 2024-10-21 DIAGNOSIS — R89.9 ABNORMAL LABORATORY TEST RESULT: ICD-10-CM

## 2024-10-21 DIAGNOSIS — S46.002A INJURY OF LEFT ROTATOR CUFF, INITIAL ENCOUNTER: Primary | ICD-10-CM

## 2024-10-21 DIAGNOSIS — N18.31 CHRONIC KIDNEY DISEASE, STAGE 3A (HCC): ICD-10-CM

## 2024-10-21 LAB
ALBUMIN: 4.5 G/DL (ref 3.5–5.2)
ANION GAP SERPL CALCULATED.3IONS-SCNC: 10 MMOL/L (ref 9–16)
BUN SERPL-MCNC: 18 MG/DL (ref 8–23)
CALCIUM SERPL-MCNC: 9.7 MG/DL (ref 8.6–10.4)
CHLORIDE SERPL-SCNC: 105 MMOL/L (ref 98–107)
CO2 SERPL-SCNC: 27 MMOL/L (ref 20–31)
CREAT SERPL-MCNC: 1.1 MG/DL (ref 0.5–0.9)
GFR, ESTIMATED: 52 ML/MIN/1.73M2
GLUCOSE SERPL-MCNC: 88 MG/DL (ref 74–99)
PHOSPHATE SERPL-MCNC: 3.7 MG/DL (ref 2.5–4.5)
POTASSIUM SERPL-SCNC: 5 MMOL/L (ref 3.7–5.3)
SODIUM SERPL-SCNC: 142 MMOL/L (ref 136–145)

## 2024-10-21 PROCEDURE — 99213 OFFICE O/P EST LOW 20 MIN: CPT | Performed by: FAMILY MEDICINE

## 2024-10-21 PROCEDURE — 1123F ACP DISCUSS/DSCN MKR DOCD: CPT | Performed by: FAMILY MEDICINE

## 2024-10-21 RX ORDER — LIDOCAINE HYDROCHLORIDE 40 MG/ML
SOLUTION TOPICAL
Qty: 50 ML | Refills: 1 | Status: SHIPPED | OUTPATIENT
Start: 2024-10-21 | End: 2024-10-28

## 2024-10-21 ASSESSMENT — PATIENT HEALTH QUESTIONNAIRE - PHQ9
SUM OF ALL RESPONSES TO PHQ9 QUESTIONS 1 & 2: 0
2. FEELING DOWN, DEPRESSED OR HOPELESS: NOT AT ALL
SUM OF ALL RESPONSES TO PHQ QUESTIONS 1-9: 0
SUM OF ALL RESPONSES TO PHQ QUESTIONS 1-9: 0
1. LITTLE INTEREST OR PLEASURE IN DOING THINGS: NOT AT ALL
SUM OF ALL RESPONSES TO PHQ QUESTIONS 1-9: 0
SUM OF ALL RESPONSES TO PHQ QUESTIONS 1-9: 0

## 2024-10-21 NOTE — PROGRESS NOTES
General FM note    Roseanna Chua is a 79 y.o. female who presents today for follow up on her  medical conditions as noted below.  Roseanna Chua is c/o of   Chief Complaint   Patient presents with    Shoulder Pain     Left shoulder    Discuss Labs     10/14/24       Patient Active Problem List:     Pancreatic cyst     Past Medical History:   Diagnosis Date    Allergic rhinitis     Hearing loss       Past Surgical History:   Procedure Laterality Date    US BREAST FINE NEEDLE ASPIRATION       Family History   Problem Relation Age of Onset    Heart Disease Mother         CHF    Cancer Father         Prostrate    Heart Disease Father         Enlarged heart    Cancer Brother         Bone cancer    Breast Cancer Neg Hx      Current Outpatient Medications   Medication Sig Dispense Refill    diclofenac sodium (VOLTAREN) 1 % GEL Apply 2 g topically 4 times daily 150 g 1    lidocaine (XYLOCAINE) 4 % external solution Apply topically as needed. 50 mL 1    famotidine (PEPCID) 20 MG tablet Take 1 tablet by mouth every morning      Multiple Vitamin (MULTI VITAMIN PO) Take by mouth      psyllium (KONSYL) 60.3 % PACK powder Take 1 packet by mouth daily 30 each 4    cycloSPORINE (RESTASIS) 0.05 % ophthalmic emulsion 1 drop 2 times daily       No current facility-administered medications for this visit.     ALLERGIES:    Allergies   Allergen Reactions    Cat Hair Extract     Regadenoson Palpitations       Social History     Tobacco Use    Smoking status: Former     Current packs/day: 0.00     Average packs/day: 0.5 packs/day for 12.0 years (6.0 ttl pk-yrs)     Types: Cigarettes     Start date: 1959     Quit date:      Years since quittin.8    Smokeless tobacco: Never    Tobacco comments:     Age 14 to 26   Substance Use Topics    Alcohol use: Not Currently     Comment: Stopped drinking 15years ago. Social      Body mass index is 24.1 kg/m².  /75   Pulse 70   Temp 97.3 °F (36.3 °C)   Wt 61.7 kg (136 lb)

## 2024-11-15 ENCOUNTER — ANESTHESIA EVENT (OUTPATIENT)
Dept: OPERATING ROOM | Age: 79
End: 2024-11-15
Payer: MEDICARE

## 2024-11-15 ENCOUNTER — HOSPITAL ENCOUNTER (OUTPATIENT)
Age: 79
Setting detail: OUTPATIENT SURGERY
Discharge: HOME | End: 2024-11-15
Attending: INTERNAL MEDICINE | Admitting: INTERNAL MEDICINE
Payer: MEDICARE

## 2024-11-15 ENCOUNTER — ANESTHESIA (OUTPATIENT)
Dept: OPERATING ROOM | Age: 79
End: 2024-11-15
Payer: MEDICARE

## 2024-11-15 VITALS
OXYGEN SATURATION: 98 % | HEART RATE: 54 BPM | HEIGHT: 63 IN | RESPIRATION RATE: 14 BRPM | SYSTOLIC BLOOD PRESSURE: 162 MMHG | DIASTOLIC BLOOD PRESSURE: 54 MMHG | BODY MASS INDEX: 23.74 KG/M2 | TEMPERATURE: 97.2 F | WEIGHT: 134 LBS

## 2024-11-15 DIAGNOSIS — K86.2 PANCREATIC CYST: ICD-10-CM

## 2024-11-15 LAB
AMYLASE FLD-CCNC: 946 U/L
CASE NUMBER:: NORMAL
SEND OUT REPORT: NORMAL
SPECIMEN DESCRIPTION: NORMAL
SPECIMEN TYPE: NORMAL
TEST NAME: NORMAL

## 2024-11-15 PROCEDURE — 6360000002 HC RX W HCPCS: Performed by: NURSE ANESTHETIST, CERTIFIED REGISTERED

## 2024-11-15 PROCEDURE — 88305 TISSUE EXAM BY PATHOLOGIST: CPT

## 2024-11-15 PROCEDURE — 2709999900 HC NON-CHARGEABLE SUPPLY: Performed by: INTERNAL MEDICINE

## 2024-11-15 PROCEDURE — 7100000011 HC PHASE II RECOVERY - ADDTL 15 MIN: Performed by: INTERNAL MEDICINE

## 2024-11-15 PROCEDURE — 2580000003 HC RX 258: Performed by: ANESTHESIOLOGY

## 2024-11-15 PROCEDURE — 3700000001 HC ADD 15 MINUTES (ANESTHESIA): Performed by: INTERNAL MEDICINE

## 2024-11-15 PROCEDURE — 3609012400 HC EGD TRANSORAL BIOPSY SINGLE/MULTIPLE: Performed by: INTERNAL MEDICINE

## 2024-11-15 PROCEDURE — 82150 ASSAY OF AMYLASE: CPT

## 2024-11-15 PROCEDURE — C1753 CATH, INTRAVAS ULTRASOUND: HCPCS | Performed by: INTERNAL MEDICINE

## 2024-11-15 PROCEDURE — 3700000000 HC ANESTHESIA ATTENDED CARE: Performed by: INTERNAL MEDICINE

## 2024-11-15 PROCEDURE — 7100000010 HC PHASE II RECOVERY - FIRST 15 MIN: Performed by: INTERNAL MEDICINE

## 2024-11-15 PROCEDURE — 3609020800 HC EGD W/EUS FNA: Performed by: INTERNAL MEDICINE

## 2024-11-15 PROCEDURE — 2500000003 HC RX 250 WO HCPCS: Performed by: NURSE ANESTHETIST, CERTIFIED REGISTERED

## 2024-11-15 RX ORDER — SODIUM CHLORIDE, SODIUM LACTATE, POTASSIUM CHLORIDE, CALCIUM CHLORIDE 600; 310; 30; 20 MG/100ML; MG/100ML; MG/100ML; MG/100ML
INJECTION, SOLUTION INTRAVENOUS CONTINUOUS
Status: DISCONTINUED | OUTPATIENT
Start: 2024-11-15 | End: 2024-11-15 | Stop reason: HOSPADM

## 2024-11-15 RX ORDER — PROPOFOL 10 MG/ML
INJECTION, EMULSION INTRAVENOUS
Status: DISCONTINUED | OUTPATIENT
Start: 2024-11-15 | End: 2024-11-15 | Stop reason: SDUPTHER

## 2024-11-15 RX ORDER — SODIUM CHLORIDE 0.9 % (FLUSH) 0.9 %
5-40 SYRINGE (ML) INJECTION EVERY 12 HOURS SCHEDULED
Status: DISCONTINUED | OUTPATIENT
Start: 2024-11-15 | End: 2024-11-15 | Stop reason: HOSPADM

## 2024-11-15 RX ORDER — CIPROFLOXACIN 250 MG/1
250 TABLET, FILM COATED ORAL 2 TIMES DAILY
Qty: 6 TABLET | Refills: 0 | Status: SHIPPED | OUTPATIENT
Start: 2024-11-15 | End: 2024-11-18

## 2024-11-15 RX ORDER — LIDOCAINE HYDROCHLORIDE 20 MG/ML
INJECTION, SOLUTION EPIDURAL; INFILTRATION; INTRACAUDAL; PERINEURAL
Status: DISCONTINUED | OUTPATIENT
Start: 2024-11-15 | End: 2024-11-15 | Stop reason: SDUPTHER

## 2024-11-15 RX ORDER — SODIUM CHLORIDE 9 MG/ML
INJECTION, SOLUTION INTRAVENOUS CONTINUOUS
Status: DISCONTINUED | OUTPATIENT
Start: 2024-11-15 | End: 2024-11-15 | Stop reason: HOSPADM

## 2024-11-15 RX ORDER — SODIUM CHLORIDE 9 MG/ML
INJECTION, SOLUTION INTRAVENOUS PRN
Status: DISCONTINUED | OUTPATIENT
Start: 2024-11-15 | End: 2024-11-15 | Stop reason: HOSPADM

## 2024-11-15 RX ORDER — CEFAZOLIN SODIUM 1 G/3ML
INJECTION, POWDER, FOR SOLUTION INTRAMUSCULAR; INTRAVENOUS
Status: DISCONTINUED | OUTPATIENT
Start: 2024-11-15 | End: 2024-11-15 | Stop reason: SDUPTHER

## 2024-11-15 RX ORDER — LIDOCAINE HYDROCHLORIDE 10 MG/ML
1 INJECTION, SOLUTION EPIDURAL; INFILTRATION; INTRACAUDAL; PERINEURAL
Status: DISCONTINUED | OUTPATIENT
Start: 2024-11-16 | End: 2024-11-15 | Stop reason: HOSPADM

## 2024-11-15 RX ORDER — SODIUM CHLORIDE 0.9 % (FLUSH) 0.9 %
5-40 SYRINGE (ML) INJECTION PRN
Status: DISCONTINUED | OUTPATIENT
Start: 2024-11-15 | End: 2024-11-15 | Stop reason: HOSPADM

## 2024-11-15 RX ADMIN — PROPOFOL 350 MCG/KG/MIN: 10 INJECTION, EMULSION INTRAVENOUS at 09:34

## 2024-11-15 RX ADMIN — LIDOCAINE HYDROCHLORIDE 60 MG: 20 INJECTION, SOLUTION EPIDURAL; INFILTRATION; INTRACAUDAL; PERINEURAL at 09:34

## 2024-11-15 RX ADMIN — SODIUM CHLORIDE: 9 INJECTION, SOLUTION INTRAVENOUS at 09:31

## 2024-11-15 RX ADMIN — CEFAZOLIN 2 G: 1 INJECTION, POWDER, FOR SOLUTION INTRAMUSCULAR; INTRAVENOUS at 09:56

## 2024-11-15 ASSESSMENT — PAIN - FUNCTIONAL ASSESSMENT
PAIN_FUNCTIONAL_ASSESSMENT: FACE, LEGS, ACTIVITY, CRY, AND CONSOLABILITY (FLACC)
PAIN_FUNCTIONAL_ASSESSMENT: 0-10

## 2024-11-15 NOTE — H&P
Interval H&P Note    Pt Name: Roseanna Chua  MRN: [id number]  YOB: 1945  Date of evaluation: 11/15/2024      [x] I have reviewed in EPIC the progress note by Dr. Easton dated 10/21/2024 for an Interval History and Physical note.     [x] I have examined  Roseanna Chua, a 79 y.o. female.There are no changes to the patient who is scheduled for ESOPHAGOGASTRODUODENOSCOPY, ENDOSCOPIC ULTRASOUND by Abhijit Cameron MD for Pancreatic cyst. Patient reports she noticed hoarseness in her voice approximately one year ago and was seen by ENT. She was diagnosed at that time with silent acid reflux and was prescribed famotidine. She reports she decided to discontinue the medication due to possible side effects. She had an MRI completed 09/12/24 that demonstrated \"1. 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 Recommend EUS/FNA if not already performed versus follow-up pancreas protocol MRI in 6 months. 2. Cholelithiasis.\" Patient reports chronic constipation and experiences intermittent sharp left upper quadrant abdominal pain. She denies bloody tarry stools, diarrhea, nausea, vomiting, or unintentional weight loss. Has had previous EGD. The patient denies new health changes, fever, chills, wheezing, cough, increased SOB, chest pain, open sores or wounds. Denies hx of diabetes. Denies any current blood thinning medications.     Vital signs: BP (!) 165/72   Pulse 68   Temp 97.5 °F (36.4 °C) (Temporal)   Resp 18   Ht 1.6 m (5' 3\")   Wt 60.8 kg (134 lb)   SpO2 97%   BMI 23.74 kg/m²     Allergies:  Cat hair extract and Regadenoson    Medications:    Prior to Admission medications    Medication Sig Start Date End Date Taking? Authorizing Provider   Multiple Vitamin (MULTI VITAMIN PO) Take by mouth   Yes Provider, MD Hernando   psyllium (KONSYL) 60.3 % PACK powder Take 1 packet by mouth daily 2/28/24  Yes Arminda Easton MD

## 2024-11-15 NOTE — ANESTHESIA POSTPROCEDURE EVALUATION
Care Due:                  Date            Visit Type   Department     Provider  --------------------------------------------------------------------------------                                EP -                              PRIMARY      [facility] FAMILY  Last Visit: 03-      CARE ([facility])   MEDICINE       Flavia Batista  Next Visit: None Scheduled  None         None Found                                                            Last  Test          Frequency    Reason                     Performed    Due Date  --------------------------------------------------------------------------------    Office Visit  15 months..  atorvastatin.............  03- 06-    CMP.........  12 months..  atorvastatin.............  03- 03-    Lipid Panel.  12 months..  atorvastatin.............  03- 03-    [employer] Embedded Care Due Messages. Reference number: [id number].   8/13/2024 5:48:42 AM CDT   Department of Anesthesiology  Postprocedure Note    Patient: Roseanna Chua  MRN: [id number]  YOB: 1945  Date of evaluation: 11/15/2024    Procedure Summary       Date: 11/15/24 Room / Location: [address]    Anesthesia Start: 0931 Anesthesia Stop: 1042    Procedures:       ESOPHAGOGASTRODUODENOSCOPY/BIOPSIES      ENDOSCOPIC ULTRASOUND/FNA Diagnosis:       Pancreatic cyst      (Pancreatic cyst [K86.2])    Surgeons: Abhijit Cameron MD Responsible Provider: Shane Suazo MD    Anesthesia Type: general, MAC ASA Status: 2            Anesthesia Type: No value filed.    Amarjit Phase I: Amarjit Score: 10    Amarjit Phase II: Amarjit Score: 10    Anesthesia Post Evaluation    Patient location during evaluation: PACU  Patient participation: complete - patient participated  Level of consciousness: awake and alert  Airway patency: patent  Nausea & Vomiting: no nausea and no vomiting  Cardiovascular status: hemodynamically stable  Respiratory status: acceptable  Hydration status: euvolemic  Pain management: adequate    No notable events documented.

## 2024-11-15 NOTE — ANESTHESIA PRE PROCEDURE
Department of Anesthesiology  Preprocedure Note       Name:  Roseanna Chua   Age:  79 y.o.  :  1945                                          MRN:  [id number]         Date:  11/15/2024      Surgeon: Surgeon(s):  Abhijit Cameron MD    Procedure: Procedure(s):  ESOPHAGOGASTRODUODENOSCOPY  ENDOSCOPIC ULTRASOUND    Medications prior to admission:   Prior to Admission medications    Medication Sig Start Date End Date Taking? Authorizing Provider   diclofenac sodium (VOLTAREN) 1 % GEL Apply 2 g topically 4 times daily 10/21/24   Arminda Easton MD   famotidine (PEPCID) 20 MG tablet Take 1 tablet by mouth every morning 23   ProviderHernando MD   Multiple Vitamin (MULTI VITAMIN PO) Take by mouth    Hernando Braun MD   psyllium (KONSYL) 60.3 % PACK powder Take 1 packet by mouth daily 24   Arminda Easton MD   cycloSPORINE (RESTASIS) 0.05 % ophthalmic emulsion 1 drop 2 times daily    ProviderHernando MD       Current medications:    Current Facility-Administered Medications   Medication Dose Route Frequency Provider Last Rate Last Admin   • [START ON 2024] lidocaine PF 1 % injection 1 mL  1 mL IntraDERmal Once PRN Nico Colunga W, DO       • 0.9 % sodium chloride infusion   IntraVENous Continuous Nico Colunga W, DO       • lactated ringers infusion   IntraVENous Continuous Nico Colunga W, DO       • sodium chloride flush 0.9 % injection 5-40 mL  5-40 mL IntraVENous 2 times per day Nico Colunga W, DO       • sodium chloride flush 0.9 % injection 5-40 mL  5-40 mL IntraVENous PRN Nico Colunga W, DO       • 0.9 % sodium chloride infusion   IntraVENous PRN Nico Colunga W, DO           Allergies:    Allergies   Allergen Reactions   • Cat Hair Extract    • Regadenoson Palpitations       Problem List:    Patient Active Problem List   Diagnosis Code   • Pancreatic cyst K86.2   • Chronic kidney disease, stage 3a (N18.31) N18.31       Past Medical History:

## 2024-11-15 NOTE — OP NOTE
Operative Note      Patient: Roseanna Chua  YOB: 1945  MRN: [id number]    Date of Procedure: 11/15/2024    Pre-Op Diagnosis Codes:      * Pancreatic cyst [K86.2]    Post-Op Diagnosis:  Pancreatic neck cyst s/p FNA.  Cholelithiasis       Procedure(s):  ESOPHAGOGASTRODUODENOSCOPY/BIOPSIES  ENDOSCOPIC ULTRASOUND    Surgeon(s):  Abhijit Cameron MD    Assistant:   * No surgical staff found *    Anesthesia: Monitor Anesthesia Care    Estimated Blood Loss (mL): Minimal    Complications: None    Specimens:   ID Type Source Tests Collected by Time Destination   A : DUODENAL BIOPSIES Tissue Duodenum SURGICAL PATHOLOGY Abhijit Cameron MD 11/15/2024 0912    B : PANCREATIC NECK CYST FNA Body Fluid Pancreas CYTOLOGY, NON-GYN, CEA, AMYLASE, BODY FLUID Abhijit Cameron MD 11/15/2024 0912        Implants:  * No implants in log *      Drains: * No LDAs found *    Findings:  Infection Present At Time Of Surgery (PATOS) (choose all levels that have infection present):  No infection present      Description of Procedure:  Informed consent was obtained from the patient after explanation of the procedure including indications, description of the procedure,  benefits and possible risks and complications of the procedure, and alternatives. Questions were answered.  The patient's history was reviewed and a directed physical examination was performed prior to the procedure.    Patient was monitored throughout the procedure with pulse oximetry and periodic assessment of vital signs. Patient was sedated as noted above. With the patient in the left lateral decubitus position, the Olympus videoendoscope followed by endoechoendoscope was placed in the patient's mouth and under direct visualization passed into the esophagus. The scope was passed to the 2nd portion of the duodenum.  The patient tolerated the procedure well and was taken to the recovery area in good condition.    EGD Findings::   Esophagus: normal.

## 2024-11-18 LAB
SURGICAL PATHOLOGY REPORT: NORMAL
SURGICAL PATHOLOGY REPORT: NORMAL

## 2024-11-29 ENCOUNTER — TELEMEDICINE (OUTPATIENT)
Dept: FAMILY MEDICINE CLINIC | Age: 79
End: 2024-11-29

## 2024-11-29 DIAGNOSIS — N18.31 CHRONIC KIDNEY DISEASE, STAGE 3A (HCC): Primary | ICD-10-CM

## 2024-11-29 NOTE — PROGRESS NOTES
General FM note    TeleHealth encounter -- Audio/Visual (During COVID-19 public health emergency)    Roseanna Chua is a 79 y.o. female who presents today for follow up on her  medical conditions as noted below.  Roseanna Chua is c/o of No chief complaint on file.      Patient Active Problem List:     Pancreatic cyst     Chronic kidney disease, stage 3a (N18.31)     Past Medical History:   Diagnosis Date    Allergic rhinitis     Arthritis     Osteoarthritis    Hearing loss     Hyperlipidemia     Pancreatic cyst       Past Surgical History:   Procedure Laterality Date    BUNIONECTOMY      Bilatral in early 2000's    COLONOSCOPY      polyps removed twice    ESOPHAGOGASTRODUODENOSCOPY  11/15/2024    with EUS    TONSILLECTOMY      TUBAL LIGATION      UPPER GASTROINTESTINAL ENDOSCOPY N/A 11/15/2024    ESOPHAGOGASTRODUODENOSCOPY/BIOPSIES performed by Abhijit Cameron MD at [facility] OR    UPPER GASTROINTESTINAL ENDOSCOPY N/A 11/15/2024    ENDOSCOPIC ULTRASOUND/FNA performed by Abhijit Cameron MD at [facility] OR     BREAST FINE NEEDLE ASPIRATION       Family History   Problem Relation Age of Onset    Heart Disease Mother         CHF    Cancer Father         Prostrate    Heart Disease Father         Enlarged heart    Cancer Brother         Bone cancer    Breast Cancer Neg Hx      Current Outpatient Medications   Medication Sig Dispense Refill    Multiple Vitamin (MULTI VITAMIN PO) Take by mouth      psyllium (KONSYL) 60.3 % PACK powder Take 1 packet by mouth daily 30 each 4    cycloSPORINE (RESTASIS) 0.05 % ophthalmic emulsion Place 1 drop into both eyes 2 times daily       No current facility-administered medications for this visit.     ALLERGIES:    Allergies   Allergen Reactions    Cat Hair Extract     Regadenoson Palpitations       Social History     Tobacco Use    Smoking status: Former     Current packs/day: 0.00     Average packs/day: 0.5 packs/day for 12.0 years (6.0 ttl pk-yrs)     Types: Cigarettes

## 2024-12-10 ENCOUNTER — TELEPHONE (OUTPATIENT)
Dept: FAMILY MEDICINE CLINIC | Age: 79
End: 2024-12-10

## 2024-12-10 NOTE — TELEPHONE ENCOUNTER
Pt called and stated that she is having rib pain under her breast and want a appointment but there is nothing available she also jacqueline like a X-Ray please advise

## 2024-12-12 ENCOUNTER — OFFICE VISIT (OUTPATIENT)
Dept: FAMILY MEDICINE CLINIC | Age: 79
End: 2024-12-12
Payer: MEDICARE

## 2024-12-12 VITALS
HEART RATE: 60 BPM | WEIGHT: 136 LBS | DIASTOLIC BLOOD PRESSURE: 65 MMHG | BODY MASS INDEX: 24.09 KG/M2 | SYSTOLIC BLOOD PRESSURE: 125 MMHG | TEMPERATURE: 97.2 F | OXYGEN SATURATION: 99 %

## 2024-12-12 DIAGNOSIS — M94.0 COSTOCHONDRITIS, ACUTE: Primary | ICD-10-CM

## 2024-12-12 DIAGNOSIS — R07.81 RIB PAIN ON LEFT SIDE: ICD-10-CM

## 2024-12-12 DIAGNOSIS — M25.512 ACUTE PAIN OF LEFT SHOULDER: ICD-10-CM

## 2024-12-12 PROCEDURE — 1159F MED LIST DOCD IN RCRD: CPT | Performed by: FAMILY MEDICINE

## 2024-12-12 PROCEDURE — 99213 OFFICE O/P EST LOW 20 MIN: CPT | Performed by: FAMILY MEDICINE

## 2024-12-12 PROCEDURE — 1123F ACP DISCUSS/DSCN MKR DOCD: CPT | Performed by: FAMILY MEDICINE

## 2024-12-12 ASSESSMENT — PATIENT HEALTH QUESTIONNAIRE - PHQ9
2. FEELING DOWN, DEPRESSED OR HOPELESS: NOT AT ALL
SUM OF ALL RESPONSES TO PHQ QUESTIONS 1-9: 0
SUM OF ALL RESPONSES TO PHQ QUESTIONS 1-9: 0
1. LITTLE INTEREST OR PLEASURE IN DOING THINGS: NOT AT ALL
SUM OF ALL RESPONSES TO PHQ QUESTIONS 1-9: 0
SUM OF ALL RESPONSES TO PHQ9 QUESTIONS 1 & 2: 0
SUM OF ALL RESPONSES TO PHQ QUESTIONS 1-9: 0

## 2024-12-12 NOTE — PROGRESS NOTES
General FM note    Roseanna Chua is a 79 y.o. female who presents today for follow up on her  medical conditions as noted below.  Roseanna Chua is c/o of   Chief Complaint   Patient presents with    Rib Pain     Left side, sharp pain and more frequent       Patient Active Problem List:     Pancreatic cyst     Chronic kidney disease, stage 3a (N18.31)     Past Medical History:   Diagnosis Date    Allergic rhinitis     Arthritis     Osteoarthritis    Hearing loss     Hyperlipidemia     Pancreatic cyst       Past Surgical History:   Procedure Laterality Date    BUNIONECTOMY      Bilatral in early 2000's    COLONOSCOPY      polyps removed twice    ESOPHAGOGASTRODUODENOSCOPY  11/15/2024    with EUS    TONSILLECTOMY      TUBAL LIGATION      UPPER GASTROINTESTINAL ENDOSCOPY N/A 11/15/2024    ESOPHAGOGASTRODUODENOSCOPY/BIOPSIES performed by Abhijit Cameron MD at [facility] OR    UPPER GASTROINTESTINAL ENDOSCOPY N/A 11/15/2024    ENDOSCOPIC ULTRASOUND/FNA performed by Abhijit Cameron MD at [facility] OR     BREAST FINE NEEDLE ASPIRATION       Family History   Problem Relation Age of Onset    Heart Disease Mother         CHF    Cancer Father         Prostrate    Heart Disease Father         Enlarged heart    Cancer Brother         Bone cancer    Breast Cancer Neg Hx      Current Outpatient Medications   Medication Sig Dispense Refill    Multiple Vitamin (MULTI VITAMIN PO) Take by mouth      psyllium (KONSYL) 60.3 % PACK powder Take 1 packet by mouth daily 30 each 4    cycloSPORINE (RESTASIS) 0.05 % ophthalmic emulsion Place 1 drop into both eyes 2 times daily       No current facility-administered medications for this visit.     ALLERGIES:    Allergies   Allergen Reactions    Cat Hair Extract     Regadenoson Palpitations       Social History     Tobacco Use    Smoking status: Former     Current packs/day: 0.00     Average packs/day: 0.5 packs/day for 12.0 years (6.0 ttl pk-yrs)     Types: Cigarettes     Start date:

## 2024-12-13 ENCOUNTER — HOSPITAL ENCOUNTER (OUTPATIENT)
Age: 79
Discharge: HOME | End: 2024-12-13
Payer: MEDICARE

## 2024-12-13 DIAGNOSIS — N18.31 CHRONIC KIDNEY DISEASE, STAGE 3A (HCC): ICD-10-CM

## 2024-12-13 DIAGNOSIS — M25.512 ACUTE PAIN OF LEFT SHOULDER: ICD-10-CM

## 2024-12-13 DIAGNOSIS — R07.81 RIB PAIN ON LEFT SIDE: ICD-10-CM

## 2024-12-13 LAB
ALBUMIN: 4.4 G/DL (ref 3.5–5.2)
ANION GAP SERPL CALCULATED.3IONS-SCNC: 8 MMOL/L (ref 9–16)
BUN SERPL-MCNC: 17 MG/DL (ref 8–23)
CALCIUM SERPL-MCNC: 9.4 MG/DL (ref 8.6–10.4)
CHLORIDE SERPL-SCNC: 106 MMOL/L (ref 98–107)
CO2 SERPL-SCNC: 27 MMOL/L (ref 20–31)
CREAT SERPL-MCNC: 1 MG/DL (ref 0.6–0.9)
GFR, ESTIMATED: 58 ML/MIN/1.73M2
GLUCOSE SERPL-MCNC: 94 MG/DL (ref 74–99)
PHOSPHATE SERPL-MCNC: 3.1 MG/DL (ref 2.5–4.5)
POTASSIUM SERPL-SCNC: 3.9 MMOL/L (ref 3.7–5.3)
SODIUM SERPL-SCNC: 141 MMOL/L (ref 136–145)

## 2024-12-13 PROCEDURE — 80069 RENAL FUNCTION PANEL: CPT

## 2024-12-13 PROCEDURE — 36415 COLL VENOUS BLD VENIPUNCTURE: CPT

## 2025-03-09 SDOH — ECONOMIC STABILITY: TRANSPORTATION INSECURITY
IN THE PAST 12 MONTHS, HAS THE LACK OF TRANSPORTATION KEPT YOU FROM MEDICAL APPOINTMENTS OR FROM GETTING MEDICATIONS?: NO

## 2025-03-09 SDOH — ECONOMIC STABILITY: FOOD INSECURITY: WITHIN THE PAST 12 MONTHS, THE FOOD YOU BOUGHT JUST DIDN'T LAST AND YOU DIDN'T HAVE MONEY TO GET MORE.: NEVER TRUE

## 2025-03-09 SDOH — ECONOMIC STABILITY: TRANSPORTATION INSECURITY
IN THE PAST 12 MONTHS, HAS LACK OF TRANSPORTATION KEPT YOU FROM MEETINGS, WORK, OR FROM GETTING THINGS NEEDED FOR DAILY LIVING?: NO

## 2025-03-09 SDOH — ECONOMIC STABILITY: FOOD INSECURITY: WITHIN THE PAST 12 MONTHS, YOU WORRIED THAT YOUR FOOD WOULD RUN OUT BEFORE YOU GOT MONEY TO BUY MORE.: NEVER TRUE

## 2025-03-09 SDOH — ECONOMIC STABILITY: INCOME INSECURITY: IN THE LAST 12 MONTHS, WAS THERE A TIME WHEN YOU WERE NOT ABLE TO PAY THE MORTGAGE OR RENT ON TIME?: NO

## 2025-03-09 ASSESSMENT — PATIENT HEALTH QUESTIONNAIRE - PHQ9
SUM OF ALL RESPONSES TO PHQ QUESTIONS 1-9: 0
SUM OF ALL RESPONSES TO PHQ9 QUESTIONS 1 & 2: 0
SUM OF ALL RESPONSES TO PHQ QUESTIONS 1-9: 0
2. FEELING DOWN, DEPRESSED OR HOPELESS: NOT AT ALL
1. LITTLE INTEREST OR PLEASURE IN DOING THINGS: NOT AT ALL
1. LITTLE INTEREST OR PLEASURE IN DOING THINGS: NOT AT ALL
2. FEELING DOWN, DEPRESSED OR HOPELESS: NOT AT ALL

## 2025-03-10 ENCOUNTER — OFFICE VISIT (OUTPATIENT)
Dept: FAMILY MEDICINE CLINIC | Age: 80
End: 2025-03-10
Payer: MEDICARE

## 2025-03-10 VITALS
HEART RATE: 73 BPM | WEIGHT: 135 LBS | SYSTOLIC BLOOD PRESSURE: 134 MMHG | HEIGHT: 63 IN | DIASTOLIC BLOOD PRESSURE: 72 MMHG | BODY MASS INDEX: 23.92 KG/M2 | OXYGEN SATURATION: 96 % | TEMPERATURE: 97.7 F

## 2025-03-10 DIAGNOSIS — J34.0 CELLULITIS OF MUCOUS MEMBRANE OF NOSE: Primary | ICD-10-CM

## 2025-03-10 DIAGNOSIS — N18.31 CHRONIC KIDNEY DISEASE, STAGE 3A (HCC): ICD-10-CM

## 2025-03-10 DIAGNOSIS — R05.1 ACUTE COUGH: ICD-10-CM

## 2025-03-10 PROCEDURE — 1159F MED LIST DOCD IN RCRD: CPT | Performed by: FAMILY MEDICINE

## 2025-03-10 PROCEDURE — 99214 OFFICE O/P EST MOD 30 MIN: CPT | Performed by: FAMILY MEDICINE

## 2025-03-10 PROCEDURE — 1123F ACP DISCUSS/DSCN MKR DOCD: CPT | Performed by: FAMILY MEDICINE

## 2025-03-10 PROCEDURE — 1160F RVW MEDS BY RX/DR IN RCRD: CPT | Performed by: FAMILY MEDICINE

## 2025-03-10 RX ORDER — CEFUROXIME AXETIL 500 MG/1
500 TABLET ORAL 2 TIMES DAILY
Qty: 20 TABLET | Refills: 0 | Status: SHIPPED | OUTPATIENT
Start: 2025-03-10 | End: 2025-03-20

## 2025-03-10 RX ORDER — MUPIROCIN 20 MG/G
OINTMENT TOPICAL
Qty: 15 G | Refills: 2 | Status: SHIPPED | OUTPATIENT
Start: 2025-03-10

## 2025-03-10 NOTE — PROGRESS NOTES
[facility]  [facility] MEDICINE  [address]    [place] [address]  Dept: [phone]      Roseanna Chua is a 79 y.o. female who presents today for follow up on her  medical conditions as noted below.      Chief Complaint   Patient presents with    Ear Pain     Bilateral     Bump in nose     Cough       Patient Active Problem List:     Pancreatic cyst     Chronic kidney disease, stage 3a (N18.31)     Past Medical History:   Diagnosis Date    Allergic rhinitis     Arthritis     Osteoarthritis    Hearing loss     Hyperlipidemia     Pancreatic cyst       Past Surgical History:   Procedure Laterality Date    BUNIONECTOMY      Bilatral in early 2000's    COLONOSCOPY      polyps removed twice    ESOPHAGOGASTRODUODENOSCOPY  11/15/2024    with EUS    TONSILLECTOMY      TUBAL LIGATION      UPPER GASTROINTESTINAL ENDOSCOPY N/A 11/15/2024    ESOPHAGOGASTRODUODENOSCOPY/BIOPSIES performed by Abhijit Cameron MD at [facility] OR    UPPER GASTROINTESTINAL ENDOSCOPY N/A 11/15/2024    ENDOSCOPIC ULTRASOUND/FNA performed by Abhijit Cameron MD at [facility] OR     BREAST FINE NEEDLE ASPIRATION       Family History   Problem Relation Age of Onset    Heart Disease Mother         CHF    Cancer Father         Prostrate    Heart Disease Father         Enlarged heart    Cancer Brother         Bone cancer    Breast Cancer Neg Hx        Current Outpatient Medications   Medication Sig Dispense Refill    Cyanocobalamin (VITAMIN B 12 PO) Vitamin B 12      Ascorbic Acid (VITAMIN C PO) Vitamin C      VITAMIN D PO Vitamin D      cefUROXime (CEFTIN) 500 MG tablet Take 1 tablet by mouth 2 times daily for 10 days 20 tablet 0    mupirocin (BACTROBAN) 2 % ointment Apply topically 3 times daily. 15 g 2    psyllium (KONSYL) 60.3 % PACK powder Take 1 packet by mouth daily 30 each 4    cycloSPORINE (RESTASIS) 0.05 % ophthalmic emulsion Place 1 drop into both eyes 2 times daily      Multiple Vitamin

## 2025-03-18 ENCOUNTER — TELEPHONE (OUTPATIENT)
Dept: FAMILY MEDICINE CLINIC | Age: 80
End: 2025-03-18

## 2025-03-18 DIAGNOSIS — K86.2 PANCREAS CYST: ICD-10-CM

## 2025-03-18 DIAGNOSIS — R97.8 ELEVATED CA 19-9 LEVEL: Primary | ICD-10-CM

## 2025-03-18 NOTE — TELEPHONE ENCOUNTER
Patient will like a 2nd opinion on a recommended surgery and will like a referral placed to another GI.       No preference but she requests a call back with their information once placed

## 2025-03-20 NOTE — TELEPHONE ENCOUNTER
Please verify with the patient if [facility].  If she would like to see a gastroenterologist please let me know if she has any preferences.

## 2025-03-21 ENCOUNTER — OFFICE VISIT (OUTPATIENT)
Age: 80
End: 2025-03-21
Payer: MEDICARE

## 2025-03-21 ENCOUNTER — TELEPHONE (OUTPATIENT)
Dept: SURGERY | Age: 80
End: 2025-03-21

## 2025-03-21 VITALS
HEART RATE: 88 BPM | DIASTOLIC BLOOD PRESSURE: 60 MMHG | SYSTOLIC BLOOD PRESSURE: 120 MMHG | WEIGHT: 133.6 LBS | BODY MASS INDEX: 23.67 KG/M2 | OXYGEN SATURATION: 98 %

## 2025-03-21 DIAGNOSIS — R07.9 CHEST PAIN, UNSPECIFIED TYPE: ICD-10-CM

## 2025-03-21 DIAGNOSIS — I10 HYPERTENSION, UNSPECIFIED TYPE: Primary | ICD-10-CM

## 2025-03-21 PROCEDURE — 3078F DIAST BP <80 MM HG: CPT

## 2025-03-21 PROCEDURE — 1159F MED LIST DOCD IN RCRD: CPT

## 2025-03-21 PROCEDURE — 3074F SYST BP LT 130 MM HG: CPT

## 2025-03-21 PROCEDURE — 99214 OFFICE O/P EST MOD 30 MIN: CPT

## 2025-03-21 PROCEDURE — 1123F ACP DISCUSS/DSCN MKR DOCD: CPT

## 2025-03-21 PROCEDURE — 93000 ELECTROCARDIOGRAM COMPLETE: CPT

## 2025-03-21 ASSESSMENT — ENCOUNTER SYMPTOMS
SHORTNESS OF BREATH: 0
CHEST TIGHTNESS: 1

## 2025-03-21 NOTE — PROGRESS NOTES
Cardiology Office Follow up      Roseanna Chua  1945  [id number]    Date: 3/21/25    CC: had concerns including Follow-up (Cardiac Clearance - Hypertension/Hyperlipidemia//Surgery for spleen and pancreas 4/18).    HPI:   Roseanna Chua  is here for routine follow up. She has no significant cardiac history.  She does report a left sided chest pain that has been increasing in frequency over the past several weeks. It radiated across the left side of her chest to left arm. It happens at rest, several times per week. No lower extremity edema, orthopnea, palpitations or decline in her functional capacity. No SOB/ LOAIZA. She is also requesting cardiac clearance for DAVINCI PANCREATECTOMY DISTAL  d/t pancreatic mass.     Past Medical:  Past Medical History:   Diagnosis Date    Allergic rhinitis     Arthritis     Osteoarthritis    Hearing loss     Hyperlipidemia     Pancreatic cyst          Past Surgical:  Past Surgical History:   Procedure Laterality Date    BUNIONECTOMY      Bilatral in early 2000's    COLONOSCOPY      polyps removed twice    ESOPHAGOGASTRODUODENOSCOPY  11/15/2024    with EUS    TONSILLECTOMY      TUBAL LIGATION      UPPER GASTROINTESTINAL ENDOSCOPY N/A 11/15/2024    ESOPHAGOGASTRODUODENOSCOPY/BIOPSIES performed by Abhijit Cameron MD at [facility] OR    UPPER GASTROINTESTINAL ENDOSCOPY N/A 11/15/2024    ENDOSCOPIC ULTRASOUND/FNA performed by Abhijit Cameron MD at [facility] OR     BREAST FINE NEEDLE ASPIRATION           Family History:  Family History   Problem Relation Age of Onset    Heart Disease Mother         CHF    Cancer Father         Prostrate    Heart Disease Father         Enlarged heart    Cancer Brother         Bone cancer    Breast Cancer Neg Hx        Social History:  Social History     Socioeconomic History    Marital status:      Spouse name: Not on file    Number of children: Not on file    Years of education: Not on file    Highest education level: Not on file

## 2025-03-21 NOTE — TELEPHONE ENCOUNTER
Shantanu with [facility], dr marroquin [phone], is requesting just the EGD report 11/15/2004. Please return call

## 2025-03-21 NOTE — TELEPHONE ENCOUNTER
Spoke with patient she has an appointment with [facility] for 2nd opinion. She states that she has an appointment on 3/27/2025 and she will discuss at this time.

## 2025-03-24 ENCOUNTER — TELEPHONE (OUTPATIENT)
Dept: SURGERY | Age: 80
End: 2025-03-24

## 2025-03-24 NOTE — TELEPHONE ENCOUNTER
Shantanu from [facility] ([phone]) has called to get MRI images sent to [facility].     Patient does not have YANG on file and will need to complete that prior to fax;  Writer informed caller of this, writer understood and will delegate.

## 2025-03-26 ENCOUNTER — HOSPITAL ENCOUNTER (OUTPATIENT)
Dept: NUCLEAR MEDICINE | Age: 80
Discharge: HOME | End: 2025-03-28
Payer: MEDICARE

## 2025-03-26 ENCOUNTER — HOSPITAL ENCOUNTER (OUTPATIENT)
Age: 80
Discharge: HOME | End: 2025-03-28
Payer: MEDICARE

## 2025-03-26 VITALS — HEART RATE: 72 BPM | DIASTOLIC BLOOD PRESSURE: 74 MMHG | RESPIRATION RATE: 16 BRPM | SYSTOLIC BLOOD PRESSURE: 122 MMHG

## 2025-03-26 DIAGNOSIS — R07.9 CHEST PAIN, UNSPECIFIED TYPE: ICD-10-CM

## 2025-03-26 PROCEDURE — 93016 CV STRESS TEST SUPVJ ONLY: CPT | Performed by: INTERNAL MEDICINE

## 2025-03-26 PROCEDURE — A9500 TC99M SESTAMIBI: HCPCS

## 2025-03-26 PROCEDURE — 3430000000 HC RX DIAGNOSTIC RADIOPHARMACEUTICAL

## 2025-03-26 PROCEDURE — 93017 CV STRESS TEST TRACING ONLY: CPT

## 2025-03-26 PROCEDURE — 78452 HT MUSCLE IMAGE SPECT MULT: CPT

## 2025-03-26 PROCEDURE — 93018 CV STRESS TEST I&R ONLY: CPT | Performed by: INTERNAL MEDICINE

## 2025-03-26 RX ORDER — TETRAKIS(2-METHOXYISOBUTYLISOCYANIDE)COPPER(I) TETRAFLUOROBORATE 1 MG/ML
15.9 INJECTION, POWDER, LYOPHILIZED, FOR SOLUTION INTRAVENOUS
Status: COMPLETED | OUTPATIENT
Start: 2025-03-26 | End: 2025-03-26

## 2025-03-26 RX ORDER — ATROPINE SULFATE 0.1 MG/ML
0.5 INJECTION INTRAVENOUS EVERY 5 MIN PRN
Status: ACTIVE | OUTPATIENT
Start: 2025-03-26 | End: 2025-03-26

## 2025-03-26 RX ORDER — METOPROLOL TARTRATE 1 MG/ML
5 INJECTION, SOLUTION INTRAVENOUS EVERY 5 MIN PRN
Status: ACTIVE | OUTPATIENT
Start: 2025-03-26 | End: 2025-03-26

## 2025-03-26 RX ORDER — NITROGLYCERIN 0.4 MG/1
0.4 TABLET SUBLINGUAL EVERY 5 MIN PRN
Status: ACTIVE | OUTPATIENT
Start: 2025-03-26 | End: 2025-03-26

## 2025-03-26 RX ORDER — TETRAKIS(2-METHOXYISOBUTYLISOCYANIDE)COPPER(I) TETRAFLUOROBORATE 1 MG/ML
41 INJECTION, POWDER, LYOPHILIZED, FOR SOLUTION INTRAVENOUS
Status: COMPLETED | OUTPATIENT
Start: 2025-03-26 | End: 2025-03-26

## 2025-03-26 RX ORDER — SODIUM CHLORIDE 0.9 % (FLUSH) 0.9 %
5-40 SYRINGE (ML) INJECTION PRN
Status: ACTIVE | OUTPATIENT
Start: 2025-03-26 | End: 2025-03-26

## 2025-03-26 RX ORDER — SODIUM CHLORIDE 9 MG/ML
500 INJECTION, SOLUTION INTRAVENOUS CONTINUOUS PRN
Status: ACTIVE | OUTPATIENT
Start: 2025-03-26 | End: 2025-03-26

## 2025-03-26 RX ADMIN — Medication 15.9 MILLICURIE: at 07:45

## 2025-03-26 RX ADMIN — Medication 41 MILLICURIE: at 08:39

## 2025-03-27 ENCOUNTER — OFFICE VISIT (OUTPATIENT)
Dept: FAMILY MEDICINE CLINIC | Age: 80
End: 2025-03-27
Payer: MEDICARE

## 2025-03-27 VITALS
BODY MASS INDEX: 23.7 KG/M2 | HEART RATE: 75 BPM | SYSTOLIC BLOOD PRESSURE: 120 MMHG | TEMPERATURE: 97 F | DIASTOLIC BLOOD PRESSURE: 65 MMHG | OXYGEN SATURATION: 98 % | WEIGHT: 133.8 LBS

## 2025-03-27 DIAGNOSIS — K86.2 PANCREAS CYST: Primary | ICD-10-CM

## 2025-03-27 DIAGNOSIS — R97.8 ELEVATED CA 19-9 LEVEL: ICD-10-CM

## 2025-03-27 LAB
NUC STRESS EJECTION FRACTION: 71 %
NUC STRESS LV EDV: 53 ML (ref 56–104)
STRESS ANGINA INDEX: 0
STRESS BASELINE DIAS BP: 74 MMHG
STRESS BASELINE HR: 65 BPM
STRESS BASELINE SYS BP: 122 MMHG
STRESS ESTIMATED WORKLOAD: 7 METS
STRESS EXERCISE DUR MIN: 6 MIN
STRESS EXERCISE DUR SEC: 0 SEC
STRESS PEAK DIAS BP: 88 MMHG
STRESS PEAK SYS BP: 180 MMHG
STRESS PERCENT HR ACHIEVED: 92 %
STRESS POST PEAK HR: 131 BPM
STRESS RATE PRESSURE PRODUCT: ABNORMAL BPM*MMHG
STRESS SR DUKE TREADMILL SCORE: 1
STRESS ST DEPRESSION: 1 MM
STRESS TARGET HR: 143 BPM
TID: 1

## 2025-03-27 PROCEDURE — 99213 OFFICE O/P EST LOW 20 MIN: CPT | Performed by: FAMILY MEDICINE

## 2025-03-27 PROCEDURE — 1123F ACP DISCUSS/DSCN MKR DOCD: CPT | Performed by: FAMILY MEDICINE

## 2025-03-27 PROCEDURE — 1159F MED LIST DOCD IN RCRD: CPT | Performed by: FAMILY MEDICINE

## 2025-03-27 NOTE — PROGRESS NOTES
General FM note    Roseanna Chua is a 79 y.o. female who presents today for follow up on her  medical conditions as noted below.  Roseanna Chua is c/o of   Chief Complaint   Patient presents with    GI Problem    Results     Nuclear stress test    Eye Pain     Rt eye       Patient Active Problem List:     Pancreatic cyst     Chronic kidney disease, stage 3a (N18.31)     Past Medical History:   Diagnosis Date    Allergic rhinitis     Arthritis     Osteoarthritis    Hearing loss     Hyperlipidemia     Pancreatic cyst       Past Surgical History:   Procedure Laterality Date    BUNIONECTOMY      Bilatral in early 2000's    COLONOSCOPY      polyps removed twice    ESOPHAGOGASTRODUODENOSCOPY  11/15/2024    with EUS    TONSILLECTOMY      TUBAL LIGATION      UPPER GASTROINTESTINAL ENDOSCOPY N/A 11/15/2024    ESOPHAGOGASTRODUODENOSCOPY/BIOPSIES performed by Abhijit Cameron MD at [email or web address] OR    UPPER GASTROINTESTINAL ENDOSCOPY N/A 11/15/2024    ENDOSCOPIC ULTRASOUND/FNA performed by Abhijit Cameron MD at [email or web address] OR     BREAST FINE NEEDLE ASPIRATION       Family History   Problem Relation Age of Onset    Heart Disease Mother         CHF    Cancer Father         Prostrate    Heart Disease Father         Enlarged heart    Cancer Brother         Bone cancer    Breast Cancer Neg Hx      Current Outpatient Medications   Medication Sig Dispense Refill    Cyanocobalamin (VITAMIN B 12 PO) Vitamin B 12      Ascorbic Acid (VITAMIN C PO) Vitamin C      VITAMIN D PO Vitamin D      mupirocin (BACTROBAN) 2 % ointment Apply topically 3 times daily. 15 g 2    psyllium (KONSYL) 60.3 % PACK powder Take 1 packet by mouth daily 30 each 4    cycloSPORINE (RESTASIS) 0.05 % ophthalmic emulsion Place 1 drop into both eyes 2 times daily      Multiple Vitamin (MULTI VITAMIN PO) Take by mouth (Patient not taking: Reported on 3/10/2025)       No current facility-administered medications for this visit.     ALLERGIES:    Allergies   Allergen

## 2025-03-28 ENCOUNTER — RESULTS FOLLOW-UP (OUTPATIENT)
Dept: CARDIOLOGY | Age: 80
End: 2025-03-28

## 2025-03-28 DIAGNOSIS — R94.39 ABNORMAL STRESS TEST: Primary | ICD-10-CM

## 2025-04-01 NOTE — TELEPHONE ENCOUNTER
----- Message from JERRELL Gustafson CNP sent at 3/28/2025 12:35 PM EDT -----  Can you please let her know Dr. Faith reviewed her stress test and we are recommending follow up with coronary CTA  ----- Message -----  From: Marcos Faith MD  Sent: 3/28/2025   6:31 AM EDT  To: JERRELL Gustafson CNP    Recommend coronary cta  ----- Message -----  From: Netta Banks APRN - CNP  Sent: 3/27/2025  11:41 AM EDT  To: Marcos Faith MD    EKG was + for ischemia, but no significant reversible or fixed perfusion defects were seen. Do you recommend any further work up?  ----- Message -----  From: Rosalee Nguyen MD  Sent: 3/27/2025  11:13 AM EDT  To: JERRELL Gustafson CNP   English

## 2025-04-08 ENCOUNTER — TRANSCRIBE ORDERS (OUTPATIENT)
Dept: ADMINISTRATIVE | Age: 80
End: 2025-04-08

## 2025-04-08 DIAGNOSIS — E16.9 DISORDER OF PANCREATIC INTERNAL SECRETION, UNSPECIFIED: Primary | ICD-10-CM

## 2025-04-08 DIAGNOSIS — R97.8 OTHER ABNORMAL TUMOR MARKERS: ICD-10-CM

## 2025-04-18 ENCOUNTER — HOSPITAL ENCOUNTER (OUTPATIENT)
Age: 80
Discharge: HOME | End: 2025-04-20
Payer: MEDICARE

## 2025-04-18 DIAGNOSIS — R94.39 ABNORMAL STRESS TEST: ICD-10-CM

## 2025-04-18 DIAGNOSIS — N18.31 CHRONIC KIDNEY DISEASE, STAGE 3A (HCC): Primary | ICD-10-CM

## 2025-04-18 LAB
EGFR, POC: 66 ML/MIN/1.73M2
POC CREATININE: 0.9 MG/DL (ref 0.51–1.19)

## 2025-04-18 PROCEDURE — 82565 ASSAY OF CREATININE: CPT

## 2025-04-18 PROCEDURE — 75574 CT ANGIO HRT W/3D IMAGE: CPT

## 2025-04-18 PROCEDURE — 6360000004 HC RX CONTRAST MEDICATION

## 2025-04-18 RX ORDER — IOPAMIDOL 755 MG/ML
92 INJECTION, SOLUTION INTRAVASCULAR
Status: COMPLETED | OUTPATIENT
Start: 2025-04-18 | End: 2025-04-18

## 2025-04-18 RX ADMIN — IOPAMIDOL 92 ML: 755 INJECTION, SOLUTION INTRAVENOUS at 11:24

## 2025-04-24 ENCOUNTER — RESULTS FOLLOW-UP (OUTPATIENT)
Age: 80
End: 2025-04-24

## 2025-04-25 ENCOUNTER — HOSPITAL ENCOUNTER (OUTPATIENT)
Dept: MRI IMAGING | Age: 80
Discharge: HOME | End: 2025-04-27
Attending: INTERNAL MEDICINE
Payer: MEDICARE

## 2025-04-25 DIAGNOSIS — E16.9 DISORDER OF PANCREATIC INTERNAL SECRETION, UNSPECIFIED: ICD-10-CM

## 2025-04-25 DIAGNOSIS — R97.8 OTHER ABNORMAL TUMOR MARKERS: ICD-10-CM

## 2025-04-25 PROCEDURE — 74183 MRI ABD W/O CNTR FLWD CNTR: CPT

## 2025-04-25 PROCEDURE — 6360000004 HC RX CONTRAST MEDICATION: Performed by: INTERNAL MEDICINE

## 2025-04-25 PROCEDURE — A9579 GAD-BASE MR CONTRAST NOS,1ML: HCPCS | Performed by: INTERNAL MEDICINE

## 2025-04-25 RX ADMIN — GADOTERIDOL 15 ML: 279.3 INJECTION, SOLUTION INTRAVENOUS at 08:17

## 2025-05-20 ENCOUNTER — TELEPHONE (OUTPATIENT)
Dept: FAMILY MEDICINE CLINIC | Age: 80
End: 2025-05-20

## 2025-07-22 ENCOUNTER — OFFICE VISIT (OUTPATIENT)
Age: 80
End: 2025-07-22
Payer: MEDICARE

## 2025-07-22 VITALS
WEIGHT: 129 LBS | SYSTOLIC BLOOD PRESSURE: 170 MMHG | HEART RATE: 63 BPM | BODY MASS INDEX: 22.86 KG/M2 | DIASTOLIC BLOOD PRESSURE: 84 MMHG | OXYGEN SATURATION: 99 % | HEIGHT: 63 IN

## 2025-07-22 DIAGNOSIS — R07.9 CHEST PAIN, UNSPECIFIED TYPE: Primary | ICD-10-CM

## 2025-07-22 PROCEDURE — 1123F ACP DISCUSS/DSCN MKR DOCD: CPT | Performed by: INTERNAL MEDICINE

## 2025-07-22 PROCEDURE — 1159F MED LIST DOCD IN RCRD: CPT | Performed by: INTERNAL MEDICINE

## 2025-07-22 PROCEDURE — 99214 OFFICE O/P EST MOD 30 MIN: CPT | Performed by: INTERNAL MEDICINE

## 2025-07-22 PROCEDURE — 93000 ELECTROCARDIOGRAM COMPLETE: CPT | Performed by: INTERNAL MEDICINE

## 2025-07-22 PROCEDURE — 1160F RVW MEDS BY RX/DR IN RCRD: CPT | Performed by: INTERNAL MEDICINE

## 2025-07-22 NOTE — PROGRESS NOTES
non-tender; bowel sounds normal; no masses,  no organomegaly  Extremities: extremities normal, atraumatic, no cyanosis or edema  Neurologic: Mental status: Alert, oriented, thought content appropriate    Labs:    CBC: No results for input(s): \"WBC\", \"HGB\", \"HCT\", \"PLT\" in the last 72 hours.  BMP: No results for input(s): \"NA\", \"K\", \"CO2\", \"BUN\", \"CREATININE\", \"LABGLOM\", \"GLUCOSE\" in the last 72 hours.  PT/INR: No results for input(s): \"PROTIME\", \"INR\" in the last 72 hours.  FASTING LIPID PANEL:  Lab Results   Component Value Date/Time    HDL 81 02/19/2024 09:50 AM    TRIG 108 02/19/2024 09:50 AM     LIVER PROFILE:No results for input(s): \"AST\", \"ALT\", \"LABALBU\" in the last 72 hours.    EKG 03/2024: NSR, LAE, possible anterior infract     Stress test 3/27/2025    Stress Combined Conclusion: Normal pharmacological myocardial perfusion study. Findings suggest a low risk of cardiac events.    Stress Function: Left ventricular function post-stress is normal. Post-stress ejection fraction is 71%. The stress end diastolic cavity size is normal. Stress end diastolic volume: 53 mL. The stress end systolic cavity size is normal.  LV EDV BP is 53 mL.    Perfusion Comments: Prone images were obtained. Prone imaging was helpful in correcting soft tissue attenuation. LV perfusion is normal. There is no evidence of inducible ischemia.    ECG: Resting ECG demonstrates normal sinus rhythm.    ECG: The stress ECG was positive for ischemia.    Stress Test: A Landen protocol stress test was performed. The patient reached stage 3 of the protocol and was stressed for 6 min and 0 sec. The patient experienced no angina during the test. The patient reported no symptoms during the stress test. Blood pressure demonstrated a normal response and heart rate demonstrated a normal response to stress.    Past Medical and Surgical History, Problem List, Allergies, Medications, Labs, Imaging, all reviewed extensively in EMR and with the

## 2025-08-07 ENCOUNTER — TELEPHONE (OUTPATIENT)
Dept: FAMILY MEDICINE CLINIC | Age: 80
End: 2025-08-07

## 2025-08-11 ENCOUNTER — TRANSCRIBE ORDERS (OUTPATIENT)
Dept: ADMINISTRATIVE | Age: 80
End: 2025-08-11

## 2025-08-11 DIAGNOSIS — R97.8 OTHER ABNORMAL TUMOR MARKERS: Primary | ICD-10-CM

## 2025-08-11 DIAGNOSIS — E16.9 DISORDER OF PANCREATIC INTERNAL SECRETION, UNSPECIFIED: ICD-10-CM

## 2025-08-20 ENCOUNTER — HOSPITAL ENCOUNTER (OUTPATIENT)
Dept: MAMMOGRAPHY | Age: 80
Discharge: HOME | End: 2025-08-22
Payer: MEDICARE

## 2025-08-20 VITALS — HEIGHT: 63 IN | BODY MASS INDEX: 24.45 KG/M2 | WEIGHT: 138 LBS

## 2025-08-20 DIAGNOSIS — Z12.31 VISIT FOR SCREENING MAMMOGRAM: ICD-10-CM

## 2025-08-20 PROCEDURE — 77067 SCR MAMMO BI INCL CAD: CPT

## 2025-08-21 ENCOUNTER — TELEPHONE (OUTPATIENT)
Age: 80
End: 2025-08-21

## 2025-08-22 ENCOUNTER — TELEPHONE (OUTPATIENT)
Dept: FAMILY MEDICINE CLINIC | Age: 80
End: 2025-08-22

## 2025-08-25 ENCOUNTER — CLINICAL SUPPORT (OUTPATIENT)
Dept: FAMILY MEDICINE CLINIC | Age: 80
End: 2025-08-25

## 2025-08-25 VITALS
HEART RATE: 72 BPM | OXYGEN SATURATION: 98 % | DIASTOLIC BLOOD PRESSURE: 82 MMHG | BODY MASS INDEX: 24.45 KG/M2 | SYSTOLIC BLOOD PRESSURE: 190 MMHG | WEIGHT: 138 LBS | HEIGHT: 63 IN

## 2025-08-25 DIAGNOSIS — R03.0 ELEVATED BLOOD PRESSURE READING: Primary | ICD-10-CM

## 2025-08-25 RX ORDER — AMLODIPINE BESYLATE 5 MG/1
5 TABLET ORAL DAILY
Qty: 90 TABLET | Refills: 0 | Status: SHIPPED | OUTPATIENT
Start: 2025-08-25

## 2025-08-28 ENCOUNTER — OFFICE VISIT (OUTPATIENT)
Dept: FAMILY MEDICINE CLINIC | Age: 80
End: 2025-08-28

## 2025-08-28 VITALS
DIASTOLIC BLOOD PRESSURE: 80 MMHG | BODY MASS INDEX: 22.57 KG/M2 | HEART RATE: 74 BPM | SYSTOLIC BLOOD PRESSURE: 151 MMHG | OXYGEN SATURATION: 98 % | WEIGHT: 127.4 LBS | TEMPERATURE: 97 F

## 2025-08-28 DIAGNOSIS — R53.82 CHRONIC FATIGUE: ICD-10-CM

## 2025-08-28 DIAGNOSIS — R06.83 SNORES: Primary | ICD-10-CM

## 2025-08-28 DIAGNOSIS — R89.9 ABNORMAL LABORATORY TEST RESULT: ICD-10-CM

## 2025-08-28 ASSESSMENT — PATIENT HEALTH QUESTIONNAIRE - PHQ9
1. LITTLE INTEREST OR PLEASURE IN DOING THINGS: NOT AT ALL
2. FEELING DOWN, DEPRESSED OR HOPELESS: NOT AT ALL
SUM OF ALL RESPONSES TO PHQ QUESTIONS 1-9: 0

## 2025-09-04 ENCOUNTER — HOSPITAL ENCOUNTER (OUTPATIENT)
Age: 80
Setting detail: SPECIMEN
Discharge: HOME | End: 2025-09-04

## 2025-09-04 DIAGNOSIS — R89.9 ABNORMAL LABORATORY TEST RESULT: ICD-10-CM

## 2025-09-04 DIAGNOSIS — R53.82 CHRONIC FATIGUE: ICD-10-CM

## 2025-09-04 LAB
25(OH)D3 SERPL-MCNC: 47.1 NG/ML (ref 30–100)
ANION GAP SERPL CALCULATED.3IONS-SCNC: 10 MMOL/L (ref 9–16)
BUN SERPL-MCNC: 15 MG/DL (ref 8–23)
CALCIUM SERPL-MCNC: 10.1 MG/DL (ref 8.6–10.4)
CHLORIDE SERPL-SCNC: 105 MMOL/L (ref 98–107)
CO2 SERPL-SCNC: 28 MMOL/L (ref 20–31)
CREAT SERPL-MCNC: 0.9 MG/DL (ref 0.6–0.9)
GFR, ESTIMATED: 66 ML/MIN/1.73M2
GLUCOSE SERPL-MCNC: 91 MG/DL (ref 74–99)
POTASSIUM SERPL-SCNC: 4.3 MMOL/L (ref 3.7–5.3)
SODIUM SERPL-SCNC: 143 MMOL/L (ref 136–145)
VIT B12 SERPL-MCNC: 336 PG/ML (ref 232–1245)

## (undated) DEVICE — Device: Brand: BALLOON3

## (undated) DEVICE — STAZ ENDO KIT: Brand: MEDLINE INDUSTRIES, INC.

## (undated) DEVICE — SYSTEM ENDOSCP US DEL 22GA W/ FNA NDL BEAC

## (undated) DEVICE — BITEBLOCK ENDOSCP 60FR MAXI WHT POLYETH STURDY W/ VELC WVN

## (undated) DEVICE — FORCEPS BX L240CM WRK CHN 2.8MM STD CAP W/ NDL MIC MESH